# Patient Record
Sex: FEMALE | Race: AMERICAN INDIAN OR ALASKA NATIVE
[De-identification: names, ages, dates, MRNs, and addresses within clinical notes are randomized per-mention and may not be internally consistent; named-entity substitution may affect disease eponyms.]

---

## 2017-04-10 ENCOUNTER — HOSPITAL ENCOUNTER (EMERGENCY)
Dept: HOSPITAL 5 - ED | Age: 32
Discharge: HOME | End: 2017-04-10
Payer: MEDICAID

## 2017-04-10 VITALS — DIASTOLIC BLOOD PRESSURE: 56 MMHG | SYSTOLIC BLOOD PRESSURE: 99 MMHG

## 2017-04-10 DIAGNOSIS — R10.9: ICD-10-CM

## 2017-04-10 DIAGNOSIS — F17.200: ICD-10-CM

## 2017-04-10 DIAGNOSIS — N39.0: Primary | ICD-10-CM

## 2017-04-10 LAB
ALBUMIN SERPL-MCNC: 3.6 G/DL (ref 3.9–5)
ALBUMIN/GLOB SERPL: 0.7 %
ALP SERPL-CCNC: 82 UNITS/L (ref 35–129)
ALT SERPL-CCNC: 9 UNITS/L (ref 7–56)
ANION GAP SERPL CALC-SCNC: 22 MMOL/L
BACTERIA #/AREA URNS HPF: (no result) /HPF
BASOPHILS NFR BLD AUTO: 0.6 % (ref 0–1.8)
BILIRUB SERPL-MCNC: 0.3 MG/DL (ref 0.1–1.2)
BILIRUB UR QL STRIP: (no result)
BLOOD UR QL VISUAL: (no result)
BUN SERPL-MCNC: 12 MG/DL (ref 7–17)
BUN/CREAT SERPL: 24 %
CALCIUM SERPL-MCNC: 8.7 MG/DL (ref 8.4–10.2)
CHLORIDE SERPL-SCNC: 93.3 MMOL/L (ref 98–107)
CO2 SERPL-SCNC: 20 MMOL/L (ref 22–30)
EOSINOPHIL NFR BLD AUTO: 0.1 % (ref 0–4.3)
GLUCOSE SERPL-MCNC: 88 MG/DL (ref 65–100)
HCT VFR BLD CALC: 36.7 % (ref 30.3–42.9)
HGB BLD-MCNC: 11.5 GM/DL (ref 10.1–14.3)
KETONES UR STRIP-MCNC: 80 MG/DL
LEUKOCYTE ESTERASE UR QL STRIP: (no result)
LIPASE SERPL-CCNC: 38 UNITS/L (ref 13–60)
MCH RBC QN AUTO: 22 PG (ref 28–32)
MCHC RBC AUTO-ENTMCNC: 31 % (ref 30–34)
MCV RBC AUTO: 71 FL (ref 79–97)
MUCOUS THREADS #/AREA URNS HPF: (no result) /HPF
NITRITE UR QL STRIP: (no result)
PH UR STRIP: 5 [PH] (ref 5–7)
PLATELET # BLD: 300 K/MM3 (ref 140–440)
POTASSIUM SERPL-SCNC: 4 MMOL/L (ref 3.6–5)
PROT SERPL-MCNC: 8.8 G/DL (ref 6.3–8.2)
RBC # BLD AUTO: 5.14 M/MM3 (ref 3.65–5.03)
RBC #/AREA URNS HPF: 13 /HPF (ref 0–6)
SODIUM SERPL-SCNC: 131 MMOL/L (ref 137–145)
UROBILINOGEN UR-MCNC: 2 MG/DL (ref ?–2)
WBC # BLD AUTO: 10.2 K/MM3 (ref 4.5–11)
WBC #/AREA URNS HPF: 1 /HPF (ref 0–6)

## 2017-04-10 PROCEDURE — 81025 URINE PREGNANCY TEST: CPT

## 2017-04-10 PROCEDURE — 36415 COLL VENOUS BLD VENIPUNCTURE: CPT

## 2017-04-10 PROCEDURE — 85025 COMPLETE CBC W/AUTO DIFF WBC: CPT

## 2017-04-10 PROCEDURE — 96361 HYDRATE IV INFUSION ADD-ON: CPT

## 2017-04-10 PROCEDURE — 83690 ASSAY OF LIPASE: CPT

## 2017-04-10 PROCEDURE — 81001 URINALYSIS AUTO W/SCOPE: CPT

## 2017-04-10 PROCEDURE — 96375 TX/PRO/DX INJ NEW DRUG ADDON: CPT

## 2017-04-10 PROCEDURE — 99283 EMERGENCY DEPT VISIT LOW MDM: CPT

## 2017-04-10 PROCEDURE — 96374 THER/PROPH/DIAG INJ IV PUSH: CPT

## 2017-04-10 PROCEDURE — 80053 COMPREHEN METABOLIC PANEL: CPT

## 2017-04-10 NOTE — EMERGENCY DEPARTMENT REPORT
ED Abdominal Pain HPI





- General


Chief Complaint: Abdominal Pain


Stated Complaint: ABD PAIN


Time Seen by Provider: 04/10/17 18:02


Source: patient


Mode of arrival: Ambulatory


Limitations: No Limitations





- History of Present Illness


Initial Comments: 





Patient is a 31-year-old female with a history of neurofibromatosis.  Patient 

presents with a week history of URI symptoms consisting of cough, nausea, 

vomiting, and mild diffuse abdominal pain.  She reports she has been taking over

-the-counter cold medicine with minor relief.  Last dose was 2 nights ago.  He 

is reporting a reduced appetite and is having bowel movements last bowel 

movement was 3 days ago, which is normal for the patient.  Otherwise no fevers, 

chills, headache, shortness of breath, chest pain, trauma, sick contacts, or 

travel.


MD Complaint: abdominal pain


-: week(s) (1)


Location: diffuse





- Related Data


 Previous Rx's











 Medication  Instructions  Recorded  Last Taken  Type


 


Docusate Sodium [Colace CAP] 100 mg PO BID PRN #20 capsule 08/31/16 Unknown Rx


 


Ibuprofen [Motrin 600 MG tab] 600 mg PO Q8H PRN #30 tablet 09/22/16 Unknown Rx


 


Cefdinir 300 mg PO BID #20 capsule 04/10/17 Unknown Rx











 Allergies











Allergy/AdvReac Type Severity Reaction Status Date / Time


 


No Known Allergies Allergy   Verified 09/21/16 18:57














ED Review of Systems


ROS: 


Stated complaint: ABD PAIN


Other details as noted in HPI








ED Past Medical Hx





- Past Medical History


Additional medical history: NEUROFIBROMATOSIS





- Surgical History


Additional Surgical History: TUMORS REMOVED FROM BACK AND LEFT FOOT





- Social History


Smoking Status: Current Every Day Smoker


Substance Use Type: None





- Medications


Home Medications: 


 Home Medications











 Medication  Instructions  Recorded  Confirmed  Last Taken  Type


 


Docusate Sodium [Colace CAP] 100 mg PO BID PRN #20 capsule 08/31/16  Unknown Rx


 


Ibuprofen [Motrin 600 MG tab] 600 mg PO Q8H PRN #30 tablet 09/22/16  Unknown Rx


 


Cefdinir 300 mg PO BID #20 capsule 04/10/17  Unknown Rx














ED Physical Exam





- General


Limitations: No Limitations


General appearance: alert, in no apparent distress





- Head


Head exam: Present: atraumatic, normocephalic





- Eye


Eye exam: Present: normal appearance





- ENT


ENT exam: Present: mucous membranes moist





- Neck


Neck exam: Present: normal inspection





- Respiratory


Respiratory exam: Present: normal lung sounds bilaterally.  Absent: respiratory 

distress





- Cardiovascular


Cardiovascular Exam: Present: regular rate, normal rhythm.  Absent: systolic 

murmur, diastolic murmur, rubs, gallop





- GI/Abdominal


GI/Abdominal exam: Present: soft, normal bowel sounds.  Absent: distended, 

tenderness, guarding





- Extremities Exam


Extremities exam: Present: normal inspection





- Back Exam


Back exam: Present: normal inspection





- Neurological Exam


Neurological exam: Present: alert, oriented X3





- Skin


Skin exam: Present: warm, dry, other (neurofibromas)





ED Course


 Vital Signs











  04/10/17 04/10/17 04/10/17





  13:06 17:43 17:44


 


Temperature 98.2 F  


 


Pulse Rate 85 93 H 84


 


Respiratory 18 18 15





Rate   


 


Blood Pressure 114/65  


 


O2 Sat by Pulse 100  





Oximetry   














  04/10/17 04/10/17 04/10/17





  17:46 17:48 17:50


 


Temperature   


 


Pulse Rate 88 88 87


 


Respiratory 19 13 11 L





Rate   


 


Blood Pressure  119/54 119/54


 


O2 Sat by Pulse 100 100 100





Oximetry   














  04/10/17 04/10/17 04/10/17





  17:52 17:54 17:56


 


Temperature   


 


Pulse Rate 80 85 88


 


Respiratory 11 L 15 13





Rate   


 


Blood Pressure 119/54 119/54 119/54


 


O2 Sat by Pulse 100 100 100





Oximetry   














  04/10/17 04/10/17 04/10/17





  17:58 18:00 18:02


 


Temperature   


 


Pulse Rate 84 91 H 83


 


Respiratory 14 13 10 L





Rate   


 


Blood Pressure 119/54 131/96 131/96


 


O2 Sat by Pulse 100 100 100





Oximetry   














  04/10/17 04/10/17 04/10/17





  18:04 18:11 18:30


 


Temperature  98.1 F 


 


Pulse Rate 79  


 


Respiratory 10 L 10 L 10 L





Rate   


 


Blood Pressure 131/96  


 


O2 Sat by Pulse 100 100 





Oximetry   














  04/10/17





  19:00


 


Temperature 


 


Pulse Rate 


 


Respiratory 10 L





Rate 


 


Blood Pressure 


 


O2 Sat by Pulse 





Oximetry 














ED Medical Decision Making





- Lab Data


Result diagrams: 


 04/10/17 13:13





 04/10/17 13:13


Critical care attestation.: 


If time is entered above; I have spent that time in minutes in the direct care 

of this critically ill patient, excluding procedure time.








ED Disposition


Clinical Impression: 


 Abdominal pain, UTI (urinary tract infection)





Disposition: DISCHARGED TO HOME OR SELFCARE


Is pt being admited?: No


Does the pt Need Aspirin: No


Condition: Stable


Instructions:  Urinary Tract Infection in Women (ED), Abdominal Pain (ED)


Prescriptions: 


Cefdinir 300 mg PO BID #20 capsule


Referrals: 


PRIMARY CARE,MD [Primary Care Provider] - 3-5 Days

## 2017-08-16 ENCOUNTER — HOSPITAL ENCOUNTER (EMERGENCY)
Dept: HOSPITAL 5 - ED | Age: 32
Discharge: HOME | End: 2017-08-16
Payer: MEDICAID

## 2017-08-16 VITALS — SYSTOLIC BLOOD PRESSURE: 106 MMHG | DIASTOLIC BLOOD PRESSURE: 61 MMHG

## 2017-08-16 DIAGNOSIS — N12: Primary | ICD-10-CM

## 2017-08-16 DIAGNOSIS — F17.200: ICD-10-CM

## 2017-08-16 LAB
%HYPO/RBC NFR BLD AUTO: (no result) %
ALBUMIN SERPL-MCNC: 3.9 G/DL (ref 3.9–5)
ALBUMIN/GLOB SERPL: 0.9 %
ALP SERPL-CCNC: 72 UNITS/L (ref 35–129)
ALT SERPL-CCNC: 11 UNITS/L (ref 7–56)
ANION GAP SERPL CALC-SCNC: 22 MMOL/L
BILIRUB SERPL-MCNC: 0.6 MG/DL (ref 0.1–1.2)
BILIRUB UR QL STRIP: (no result)
BLASTOCYTES % (MANUAL): 0 %
BLOOD UR QL VISUAL: (no result)
BUN SERPL-MCNC: 9 MG/DL (ref 7–17)
BUN/CREAT SERPL: 15 %
CALCIUM SERPL-MCNC: 9 MG/DL (ref 8.4–10.2)
CHLORIDE SERPL-SCNC: 98.5 MMOL/L (ref 98–107)
CO2 SERPL-SCNC: 21 MMOL/L (ref 22–30)
GLUCOSE SERPL-MCNC: 87 MG/DL (ref 65–100)
HCT VFR BLD CALC: 31 % (ref 30.3–42.9)
HGB BLD-MCNC: 9.9 GM/DL (ref 10.1–14.3)
KETONES UR STRIP-MCNC: (no result) MG/DL
LEUKOCYTE ESTERASE UR QL STRIP: (no result)
LIPASE SERPL-CCNC: 21 UNITS/L (ref 13–60)
MCH RBC QN AUTO: 21 PG (ref 28–32)
MCHC RBC AUTO-ENTMCNC: 32 % (ref 30–34)
MCV RBC AUTO: 67 FL (ref 79–97)
MICROCYTES BLD QL SMEAR: (no result)
MUCOUS THREADS #/AREA URNS HPF: (no result) /HPF
NITRITE UR QL STRIP: (no result)
PH UR STRIP: 5 [PH] (ref 5–7)
PLATELET # BLD: 436 K/MM3 (ref 140–440)
POIKILOCYTOSIS BLD QL SMEAR: (no result)
POTASSIUM SERPL-SCNC: 3.9 MMOL/L (ref 3.6–5)
PROT SERPL-MCNC: 8.4 G/DL (ref 6.3–8.2)
RBC # BLD AUTO: 4.64 M/MM3 (ref 3.65–5.03)
RBC #/AREA URNS HPF: 8 /HPF (ref 0–6)
SODIUM SERPL-SCNC: 138 MMOL/L (ref 137–145)
TOTAL CELLS COUNTED PERCENT: 16
UROBILINOGEN UR-MCNC: < 2 MG/DL (ref ?–2)
WBC # BLD AUTO: 10.6 K/MM3 (ref 4.5–11)
WBC #/AREA URNS HPF: 53 /HPF (ref 0–6)

## 2017-08-16 PROCEDURE — 83690 ASSAY OF LIPASE: CPT

## 2017-08-16 PROCEDURE — 81001 URINALYSIS AUTO W/SCOPE: CPT

## 2017-08-16 PROCEDURE — 99284 EMERGENCY DEPT VISIT MOD MDM: CPT

## 2017-08-16 PROCEDURE — 85025 COMPLETE CBC W/AUTO DIFF WBC: CPT

## 2017-08-16 PROCEDURE — 87076 CULTURE ANAEROBE IDENT EACH: CPT

## 2017-08-16 PROCEDURE — 96365 THER/PROPH/DIAG IV INF INIT: CPT

## 2017-08-16 PROCEDURE — 85007 BL SMEAR W/DIFF WBC COUNT: CPT

## 2017-08-16 PROCEDURE — 87186 SC STD MICRODIL/AGAR DIL: CPT

## 2017-08-16 PROCEDURE — 87210 SMEAR WET MOUNT SALINE/INK: CPT

## 2017-08-16 PROCEDURE — 87086 URINE CULTURE/COLONY COUNT: CPT

## 2017-08-16 PROCEDURE — 80053 COMPREHEN METABOLIC PANEL: CPT

## 2017-08-16 PROCEDURE — 96375 TX/PRO/DX INJ NEW DRUG ADDON: CPT

## 2017-08-16 PROCEDURE — 36415 COLL VENOUS BLD VENIPUNCTURE: CPT

## 2017-08-16 PROCEDURE — 87591 N.GONORRHOEAE DNA AMP PROB: CPT

## 2017-08-16 NOTE — EMERGENCY DEPARTMENT REPORT
ED General Adult HPI





- General


Chief complaint: Abdominal Pain


Stated complaint: HEADACHE/STOMACH PAIN/


Time Seen by Provider: 08/16/17 18:37


Source: patient, RN notes reviewed, old records reviewed


Mode of arrival: Ambulatory


Limitations: No Limitations





- History of Present Illness


Initial comments: 


This is a 31-year-old female.  I have evaluated her in the past.  Past medical 

history includes neurofibromatosis, CT scan confirmed pyelonephritis, which was 

pansensitive.  The patient presents to the ER today with complaint of flank pain

, and epigastric abdominal pain.  She also describes feelings of hot and cold, 

has nausea with no emesis, and suprapubic pain.  To me she denies headache.  

The symptoms have been going on for the past 4 days.  She reports there is 

similar to prior episodes of pyelonephritis.





No recent antibiotic use.








-: Gradual


Location: back, abdomen


Consistency: intermittent


Improves with: rest


Worsens with: movement


Associated Symptoms: diaphoresis, fever/chills, loss of appetite, malaise, 

nausea/vomiting, weakness.  denies: confusion, chest pain





- Related Data


 Previous Rx's











 Medication  Instructions  Recorded  Last Taken  Type


 


Docusate Sodium [Colace CAP] 100 mg PO BID PRN #20 capsule 08/31/16 Unknown Rx


 


Ibuprofen [Motrin 600 MG tab] 600 mg PO Q8H PRN #30 tablet 09/22/16 Unknown Rx


 


Cefdinir 300 mg PO BID #20 capsule 04/10/17 Unknown Rx


 


Ibuprofen [Motrin] 600 mg PO Q8H PRN #30 tablet 08/16/17 Unknown Rx


 


Levofloxacin [Levaquin] 750 mg PO QDAY #10 tablet 08/16/17 Unknown Rx


 


Metoclopramide [Reglan] 10 mg PO QID PRN #30 tablet 08/16/17 Unknown Rx


 


oxyCODONE [Roxicodone] 5 mg PO Q6HR PRN #15 tablet 08/16/17 Unknown Rx











 Allergies











Allergy/AdvReac Type Severity Reaction Status Date / Time


 


No Known Allergies Allergy   Verified 09/21/16 18:57














ED Review of Systems


ROS: 


Stated complaint: HEADACHE/STOMACH PAIN/


Other details as noted in HPI





Constitutional: malaise


Eyes: denies: vision change


ENT: denies: epistaxis


Respiratory: denies: cough


Cardiovascular: denies: chest pain


Gastrointestinal: abdominal pain


Genitourinary: denies: abnormal menses


Musculoskeletal: back pain


Skin: denies: lesions


Neurological: denies: weakness


Psychiatric: denies: anxiety





ED Past Medical Hx





- Past Medical History


Previous Medical History?: Yes


Additional medical history: NEUROFIBROMATOSIS





- Surgical History


Past Surgical History?: Yes


Additional Surgical History: TUMORS REMOVED FROM BACK AND LEFT FOOT





- Social History


Smoking Status: Current Every Day Smoker


Substance Use Type: None





- Medications


Home Medications: 


 Home Medications











 Medication  Instructions  Recorded  Confirmed  Last Taken  Type


 


Docusate Sodium [Colace CAP] 100 mg PO BID PRN #20 capsule 08/31/16  Unknown Rx


 


Ibuprofen [Motrin 600 MG tab] 600 mg PO Q8H PRN #30 tablet 09/22/16  Unknown Rx


 


Cefdinir 300 mg PO BID #20 capsule 04/10/17  Unknown Rx


 


Ibuprofen [Motrin] 600 mg PO Q8H PRN #30 tablet 08/16/17  Unknown Rx


 


Levofloxacin [Levaquin] 750 mg PO QDAY #10 tablet 08/16/17  Unknown Rx


 


Metoclopramide [Reglan] 10 mg PO QID PRN #30 tablet 08/16/17  Unknown Rx


 


oxyCODONE [Roxicodone] 5 mg PO Q6HR PRN #15 tablet 08/16/17  Unknown Rx














ED Physical Exam





- General


Limitations: No Limitations


General appearance: alert, in no apparent distress





- Head


Head exam: Present: atraumatic, normocephalic





- Eye


Eye exam: Present: normal appearance, PERRL, EOMI, other (visual acuity intact 

to finger counting, color perception, reading at a close distance).  Absent: 

nystagmus





- ENT


ENT exam: Present: normal exam, normal orophraynx, mucous membranes moist, 

normal external ear exam





- Neck


Neck exam: Present: normal inspection, full ROM.  Absent: tenderness, 

meningismus





- Respiratory


Respiratory exam: Present: normal lung sounds bilaterally.  Absent: respiratory 

distress, wheezes, rales, rhonchi, stridor, chest wall tenderness, accessory 

muscle use, decreased breath sounds, prolonged expiratory





- Cardiovascular


Cardiovascular Exam: Present: regular rate, normal rhythm, normal heart sounds.

  Absent: bradycardia, tachycardia, irregular rhythm, systolic murmur, 

diastolic murmur, rubs, gallop





- GI/Abdominal


GI/Abdominal exam: Present: soft, tenderness, normal bowel sounds, other (there 

is epigastric tenderness.  There is suprapubic tenderness.  There is no right 

lower quadrant tenderness.).  Absent: distended, guarding, rebound, rigid, 

pulsatile mass





- 


External exam: Present: normal external exam


Speculum exam: Present: normal speculum exam


Bi-manual exam: Present: normal bi-manual exam, other (escorted by a paramedic 

Kamille Garcia).  Absent: cervical motion tendernes, adnexal tenderness, adnexal 

mass, uterine enlargement, uterine tenderness





- Extremities Exam


Extremities exam: Present: normal inspection, full ROM, normal capillary 

refill.  Absent: pedal edema, joint swelling, calf tenderness





- Back Exam


Back exam: Present: normal inspection, full ROM, CVA tenderness (R), CVA 

tenderness (L).  Absent: tenderness, paraspinal tenderness, vertebral tenderness





- Neurological Exam


Neurological exam: Present: alert, oriented X3, normal gait, other (Extraocular 

movements intact.  Tongue midline.  No facial droop.  Facial sensation intact 

to light touch in the V1, V2, V3 distribution bilaterally.  5 and 5 strength in 

4 extremities..  Sensation is intact to light touch in 4 extremities.).  Absent

: motor sensory deficit





- Psychiatric


Psychiatric exam: Present: normal affect, normal mood





- Skin


Skin exam: Present: warm, dry, intact, normal color.  Absent: rash





ED Course


 Vital Signs











  08/16/17 08/16/17





  09:12 19:04


 


Temperature 100 F H 99.8 F H


 


Pulse Rate 88 82


 


Respiratory 15 18





Rate  


 


Blood Pressure 97/60 


 


Blood Pressure  106/61





[Left]  


 


O2 Sat by Pulse 100 100





Oximetry  














- Reevaluation(s)


Reevaluation #1: 





08/17/17 02:16 prior to discharge, the patient appeared quite comfortable, was 

eating and drinking without difficulty, and endorse that her symptoms had 

markedly improved and resolved.

















ED Medical Decision Making





- Lab Data


Result diagrams: 


 08/16/17 09:21





 08/16/17 09:21








 Vital Signs











  08/16/17 08/16/17





  09:12 19:04


 


Temperature 100 F H 99.8 F H


 


Pulse Rate 88 82


 


Respiratory 15 18





Rate  


 


Blood Pressure 97/60 


 


Blood Pressure  106/61





[Left]  


 


O2 Sat by Pulse 100 100





Oximetry  














 Lab Results











  08/16/17 08/16/17 08/16/17 Range/Units





  09:21 09:21 10:41 


 


WBC  10.6    (4.5-11.0)  K/mm3


 


RBC  4.64    (3.65-5.03)  M/mm3


 


Hgb  9.9 L    (10.1-14.3)  gm/dl


 


Hct  31.0    (30.3-42.9)  %


 


MCV  67 L    (79-97)  fl


 


MCH  21 L    (28-32)  pg


 


MCHC  32    (30-34)  %


 


RDW  17.6 H    (13.2-15.2)  %


 


Plt Count  436    (140-440)  K/mm3


 


Mono % (Auto)  Np    


 


Add Manual Diff  Complete    


 


Total Counted  100    


 


Seg Neuts % (Manual)  70.0    (40.0-70.0)  %


 


Band Neutrophils %  3.0    %


 


Lymphocytes % (Manual)  11.0 L    (13.4-35.0)  %


 


Reactive Lymphs % (Man)  0    %


 


Monocytes % (Manual)  16.0 H    (0.0-7.3)  %


 


Eosinophils % (Manual)  0    (0.0-4.3)  %


 


Basophils % (Manual)  0    (0.0-1.8)  %


 


Metamyelocytes %  0    %


 


Myelocytes %  0    %


 


Promyelocytes %  0    %


 


Blast Cells %  0    %


 


Nucleated RBC %  Not Reportable    


 


Seg Neutrophils # Man  7.4    (1.8-7.7)  K/mm3


 


Band Neutrophils #  0.3    K/mm3


 


Lymphocytes # (Manual)  1.2    (1.2-5.4)  K/mm3


 


Abs React Lymphs (Man)  0.0    K/mm3


 


Monocytes # (Manual)  1.7 H    (0.0-0.8)  K/mm3


 


Eosinophils # (Manual)  0.0    (0.0-0.4)  K/mm3


 


Basophils # (Manual)  0.0    (0.0-0.1)  K/mm3


 


Metamyelocytes #  0.0    K/mm3


 


Myelocytes #  0.0    K/mm3


 


Promyelocytes #  0.0    K/mm3


 


Blast Cells #  0.0    K/mm3


 


WBC Morphology  Not Reportable    


 


Hypersegmented Neuts  Not Reportable    


 


Hyposegmented Neuts  Not Reportable    


 


Hypogranular Neuts  Not Reportable    


 


Smudge Cells  Not Reportable    


 


Toxic Granulation  Not Reportable    


 


Toxic Vacuolation  Not Reportable    


 


Dohle Bodies  Not Reportable    


 


Pelger-Huet Anomaly  Not Reportable    


 


Tanya Rods  Not Reportable    


 


Platelet Estimate  Not Reportable    


 


Clumped Platelets  Not Reportable    


 


Plt Clumps, EDTA  Not Reportable    


 


Large Platelets  Not Reportable    


 


Giant Platelets  Not Reportable    


 


Platelet Satelliting  Not Reportable    


 


Plt Morphology Comment  Not Reportable    


 


RBC Morphology  Not Reportable    


 


Dimorphic RBCs  Not Reportable    


 


Polychromasia  Not Reportable    


 


Hypochromasia  2+    


 


Poikilocytosis  1+    


 


Anisocytosis  Not Reportable    


 


Microcytosis  2+    


 


Macrocytosis  Not Reportable    


 


Spherocytes  Not Reportable    


 


Pappenheimer Bodies  Not Reportable    


 


Sickle Cells  Not Reportable    


 


Target Cells  Not Reportable    


 


Tear Drop Cells  Not Reportable    


 


Ovalocytes  Not Reportable    


 


Helmet Cells  Not Reportable    


 


[place] Bodies  Not Reportable    


 


[place] Rings  Not Reportable    


 


Kassandra Cells  Not Reportable    


 


Bite Cells  Not Reportable    


 


Crenated Cell  Not Reportable    


 


Elliptocytes  Not Reportable    


 


Acanthocytes (Spur)  Not Reportable    


 


Rouleaux  Not Reportable    


 


Hemoglobin C Crystals  Not Reportable    


 


Schistocytes  Not Reportable    


 


Malaria parasites  Not Reportable    


 


Ulysses Bodies  Not Reportable    


 


Hem Pathologist Commnt  No    


 


Sodium   138   (137-145)  mmol/L


 


Potassium   3.9   (3.6-5.0)  mmol/L


 


Chloride   98.5   ()  mmol/L


 


Carbon Dioxide   21 L   (22-30)  mmol/L


 


Anion Gap   22   mmol/L


 


BUN   9   (7-17)  mg/dL


 


Creatinine   0.6 L   (0.7-1.2)  mg/dL


 


Estimated GFR   > 60   ml/min


 


BUN/Creatinine Ratio   15.00   %


 


Glucose   87   ()  mg/dL


 


Calcium   9.0   (8.4-10.2)  mg/dL


 


Total Bilirubin   0.60   (0.1-1.2)  mg/dL


 


AST   13   (5-40)  units/L


 


ALT   11   (7-56)  units/L


 


Alkaline Phosphatase   72   ()  units/L


 


Total Protein   8.4 H   (6.3-8.2)  g/dL


 


Albumin   3.9   (3.9-5)  g/dL


 


Albumin/Globulin Ratio   0.9   %


 


Lipase   21   (13-60)  units/L


 


Urine Color    Yellow  (Yellow)  


 


Urine Turbidity    Slightly-cloudy  (Clear)  


 


Urine pH    5.0  (5.0-7.0)  


 


Ur Specific Gravity    1.016  (1.003-1.030)  


 


Urine Protein    30 mg/dl  (Negative)  mg/dL


 


Urine Glucose (UA)    Neg  (Negative)  mg/dL


 


Urine Ketones    Tr  (Negative)  mg/dL


 


Urine Blood    Mod  (Negative)  


 


Urine Nitrite    Pos  (Negative)  


 


Urine Bilirubin    Neg  (Negative)  


 


Urine Urobilinogen    < 2.0  (<2.0)  mg/dL


 


Ur Leukocyte Esterase    Lg  (Negative)  


 


Urine WBC (Auto)    53.0 H  (0.0-6.0)  /HPF


 


Urine RBC (Auto)    8.0  (0.0-6.0)  /HPF


 


U Epithel Cells (Auto)    3.0  (0-13.0)  /HPF


 


Ur Transition Epith Cell    1  /HPF


 


Urine Mucus    3+  /HPF

















- Medical Decision Making


Differential diagnosis: Cystitis, pyelonephritis





Assessment and plan: 31-year-old female with an identical presentation to when 

I evaluated her September 2016.  She endorses urinary frequency and obstructive 

urinary symptoms.  Urinalysis today demonstrates positive nitrites, large leuks

, 53 WBCs, with suprapubic tenderness and bilateral CVA tenderness.  There is 

no gynecologic tenderness, there is no right lower quadrant tenderness.  

Extensive discussion had with patient.  I don't believe she requires CT 

scanning at this time, as I think her clinical presentation is consistent with 

pyelonephritis.








This was discussed with the patient, who agreed, and through shared decision 

making, declined a CT scan (concerned with radiation induced malignancy)


which I think is reasonable.  The patient will be started empirically on 

appropriate therapy for pyelonephritis, and she is going to follow up in 2-3 

days with either myself or her primary care doctor for repeat evaluation.  She 

is afebrile at this time with reassuring vital signs, and is reliable for 

discharge with his plan of care.  A urine culture from September 2016 

demonstrated pan sensitivity.  She denies headache to me, she has an 

appropriate neurologic examination, with a GCS of 15, NIH score of 0, with no 

neck pain or neck stiffness or nuchal rigidity.














Critical care attestation.: 


If time is entered above; I have spent that time in minutes in the direct care 

of this critically ill patient, excluding procedure time.








ED Disposition


Clinical Impression: 


 Pyelonephritis





Disposition: DC-01 TO HOME OR SELFCARE


Is pt being admited?: No


Does the pt Need Aspirin: No


Condition: Stable


Instructions:  Acute Pyelonephritis (ED)


Additional Instructions: 


Cultures were sent today, results will be available in the next 3-5 days.  

Please have a primary care doctor contact the medical records department to 

obtain culture results.  Avoid alcohol consumption when taking the pain 

medication, nausea medication, antibiotics.  Follow up in the next 48-72 hours 

for repeat evaluation.  Follow up with either a primary care doctor, return to 

the ER for repeat evaluation by myself or another ER provider.


Return to the ER right away with new pain, worsened pain, migration of pain, 

fevers, chills, intractable nausea or vomiting, inability to tolerate liquid 

feeds, confusion, change in mental status.











Prescriptions: 


Ibuprofen [Motrin] 600 mg PO Q8H PRN #30 tablet


 PRN Reason: Pain


Levofloxacin [Levaquin] 750 mg PO QDAY #10 tablet


Metoclopramide [Reglan] 10 mg PO QID PRN #30 tablet


 PRN Reason: Nausea


oxyCODONE [Roxicodone] 5 mg PO Q6HR PRN #15 tablet


 PRN Reason: Pain


Referrals: 


PRIMARY CARE,MD [Primary Care Provider] - 3-5 Days


STEFANIA RM MD [Staff Physician] - 3-5 Days

## 2017-08-18 ENCOUNTER — HOSPITAL ENCOUNTER (EMERGENCY)
Dept: HOSPITAL 5 - ED | Age: 32
Discharge: HOME | End: 2017-08-18
Payer: MEDICAID

## 2017-08-18 VITALS — DIASTOLIC BLOOD PRESSURE: 70 MMHG | SYSTOLIC BLOOD PRESSURE: 113 MMHG

## 2017-08-18 DIAGNOSIS — F17.200: ICD-10-CM

## 2017-08-18 DIAGNOSIS — Z09: Primary | ICD-10-CM

## 2017-08-18 PROCEDURE — 99282 EMERGENCY DEPT VISIT SF MDM: CPT

## 2017-08-18 NOTE — EMERGENCY DEPARTMENT REPORT
ED Recheck HPI





- General


Chief Complaint: Recheck/Abnormal Lab/Rx


Stated Complaint: CALLED BACK/ABD PAIN


Time Seen by Provider: 08/18/17 17:50


Source: patient, RN notes reviewed, old records reviewed


Mode of arrival: Ambulatory


Limitations: No Limitations





- History of Present Illness


Initial Comments: 





This is a 31-year-old female.  I recently evaluated her and presumptively treat 

her for pyelonephritis.  I asked her to come back in a few days for recheck.  

She reports that all of her symptoms are improving.  She is able to tolerate 

liquid feeds.  She currently has no headache, neck pain, chest pain, abdominal 

pain or shortness of breath, and she endorses compliance with her prescribed 

therapy.  Urine culture is pending, however thus far it is demonstrating gram-

negative rods.  Sensitivities pending.


MD Complaint: other


Returns Today for: other


Symptoms Since Prior Visit: no new symptoms, improved


Context: planned re-check


Associated Symptoms: none


Treatments Prior to Arrival: Given Antibiotics on, Given Pain Meds on, other





- Related Data


 Previous Rx's











 Medication  Instructions  Recorded  Last Taken  Type


 


Docusate Sodium [Colace CAP] 100 mg PO BID PRN #20 capsule 08/31/16 Unknown Rx


 


Ibuprofen [Motrin 600 MG tab] 600 mg PO Q8H PRN #30 tablet 09/22/16 Unknown Rx


 


Cefdinir 300 mg PO BID #20 capsule 04/10/17 Unknown Rx


 


Ibuprofen [Motrin] 600 mg PO Q8H PRN #30 tablet 08/16/17 Unknown Rx


 


Levofloxacin [Levaquin] 750 mg PO QDAY #10 tablet 08/16/17 Unknown Rx


 


Metoclopramide [Reglan] 10 mg PO QID PRN #30 tablet 08/16/17 Unknown Rx


 


oxyCODONE [Roxicodone] 5 mg PO Q6HR PRN #15 tablet 08/16/17 Unknown Rx











 Allergies











Allergy/AdvReac Type Severity Reaction Status Date / Time


 


No Known Allergies Allergy   Verified 08/18/17 17:28














ED Review of Systems


ROS: 


Stated complaint: CALLED BACK/ABD PAIN


Other details as noted in HPI





Constitutional: denies: fever, malaise


Eyes: denies: vision change


ENT: denies: epistaxis


Respiratory: denies: cough


Cardiovascular: denies: palpitations


Gastrointestinal: denies: abdominal pain


Genitourinary: denies: urgency, dysuria


Musculoskeletal: denies: back pain


Skin: denies: rash, lesions


Neurological: denies: headache


Psychiatric: denies: anxiety





ED Past Medical Hx





- Past Medical History


Additional medical history: NEUROFIBROMATOSIS





- Surgical History


Additional Surgical History: TUMORS REMOVED FROM BACK AND LEFT FOOT





- Social History


Smoking Status: Current Every Day Smoker


Substance Use Type: Prescribed





- Medications


Home Medications: 


 Home Medications











 Medication  Instructions  Recorded  Confirmed  Last Taken  Type


 


Docusate Sodium [Colace CAP] 100 mg PO BID PRN #20 capsule 08/31/16  Unknown Rx


 


Ibuprofen [Motrin 600 MG tab] 600 mg PO Q8H PRN #30 tablet 09/22/16  Unknown Rx


 


Cefdinir 300 mg PO BID #20 capsule 04/10/17  Unknown Rx


 


Ibuprofen [Motrin] 600 mg PO Q8H PRN #30 tablet 08/16/17  Unknown Rx


 


Levofloxacin [Levaquin] 750 mg PO QDAY #10 tablet 08/16/17  Unknown Rx


 


Metoclopramide [Reglan] 10 mg PO QID PRN #30 tablet 08/16/17  Unknown Rx


 


oxyCODONE [Roxicodone] 5 mg PO Q6HR PRN #15 tablet 08/16/17  Unknown Rx














ED Physical Exam





- General


Limitations: No Limitations


General appearance: alert, in no apparent distress





- Head


Head exam: Present: atraumatic, normocephalic





- Eye


Eye exam: Present: normal appearance, EOMI.  Absent: nystagmus





- ENT


ENT exam: Present: normal exam, normal orophraynx, mucous membranes moist, 

normal external ear exam





- Neck


Neck exam: Present: normal inspection, full ROM





- Respiratory


Respiratory exam: Present: normal lung sounds bilaterally.  Absent: respiratory 

distress





- Cardiovascular


Cardiovascular Exam: Present: regular rate, normal rhythm, normal heart sounds.

  Absent: bradycardia, tachycardia, irregular rhythm, systolic murmur, 

diastolic murmur, rubs, gallop





- GI/Abdominal


GI/Abdominal exam: Present: soft, normal bowel sounds.  Absent: distended, 

tenderness, guarding, rebound, rigid, pulsatile mass





- Extremities Exam


Extremities exam: Present: normal inspection, normal capillary refill.  Absent: 

calf tenderness





- Back Exam


Back exam: Present: normal inspection, full ROM.  Absent: tenderness, CVA 

tenderness (R), CVA tenderness (L)





- Neurological Exam


Neurological exam: Present: alert, oriented X3, normal gait, other (Extraocular 

movements intact.  Tongue midline.  No facial droop.  Facial sensation intact 

to light touch in the V1, V2, V3 distribution bilaterally.  5 and 5 strength in 

4 extremities..  Sensation is intact to light touch in 4 extremities.).  Absent

: motor sensory deficit





- Psychiatric


Psychiatric exam: Present: normal affect, normal mood





- Skin


Skin exam: Present: warm, dry, intact, normal color.  Absent: rash





ED Course


 Vital Signs











  08/18/17





  17:28


 


Temperature 98.5 F


 


Pulse Rate 84


 


Respiratory 15





Rate 


 


Blood Pressure 113/70


 


O2 Sat by Pulse 100





Oximetry 














ED Recheck MDM





- Core Measures


Measure Exclusions: not indicated





- Medical Decision Making





Differential diagnosis: Pyelonephritis, follow-up, clinically improving





Assessment and plan: 31-year-old female who is dramatically improving with the 

presumed diagnosis of pyelonephritis.  Cultures are pending, sensitivities not 

resulted, she appears to be improving, she is suitable to follow up with 

outpatient primary care.  Return precautions are reviewed.


Critical care attestation.: 


If time is entered above; I have spent that time in minutes in the direct care 

of this critically ill patient, excluding procedure time.








ED Disposition


Clinical Impression: 


 Follow up





Disposition: DC-01 TO HOME OR SELFCARE


Is pt being admited?: No


Does the pt Need Aspirin: No


Condition: Good


Instructions:  Acute Pyelonephritis (ED)


Additional Instructions: 


Continue current outpatient antibiotics.  Continue outpatient pain medication 

and nausea medication.  Follow up with a primary care doctor within 2 weeks.  

Return to the ER runaway with fevers, chills, chest pain, shortness of breath, 

intractable nausea or vomiting, confusion, inability to tolerate liquid feeds.


Referrals: 


CATHLEEN SAAVEDRA MD [Staff Physician] - 3-5 Days

## 2017-10-01 ENCOUNTER — HOSPITAL ENCOUNTER (EMERGENCY)
Dept: HOSPITAL 5 - ED | Age: 32
Discharge: HOME | End: 2017-10-01
Payer: MEDICAID

## 2017-10-01 VITALS — DIASTOLIC BLOOD PRESSURE: 60 MMHG | SYSTOLIC BLOOD PRESSURE: 107 MMHG

## 2017-10-01 DIAGNOSIS — F17.200: ICD-10-CM

## 2017-10-01 DIAGNOSIS — L02.01: Primary | ICD-10-CM

## 2017-10-01 LAB
ANION GAP SERPL CALC-SCNC: 17 MMOL/L
BUN SERPL-MCNC: 10 MG/DL (ref 7–17)
BUN/CREAT SERPL: 16.66 %
CALCIUM SERPL-MCNC: 9.3 MG/DL (ref 8.4–10.2)
CHLORIDE SERPL-SCNC: 102.5 MMOL/L (ref 98–107)
CO2 SERPL-SCNC: 27 MMOL/L (ref 22–30)
GLUCOSE SERPL-MCNC: 72 MG/DL (ref 65–100)
HCT VFR BLD CALC: 31.5 % (ref 30.3–42.9)
HGB BLD-MCNC: 9.8 GM/DL (ref 10.1–14.3)
MCH RBC QN AUTO: 21 PG (ref 28–32)
MCHC RBC AUTO-ENTMCNC: 31 % (ref 30–34)
MCV RBC AUTO: 68 FL (ref 79–97)
PLATELET # BLD: 364 K/MM3 (ref 140–440)
POTASSIUM SERPL-SCNC: 4 MMOL/L (ref 3.6–5)
RBC # BLD AUTO: 4.63 M/MM3 (ref 3.65–5.03)
SODIUM SERPL-SCNC: 142 MMOL/L (ref 137–145)
WBC # BLD AUTO: 8.8 K/MM3 (ref 4.5–11)

## 2017-10-01 PROCEDURE — 85025 COMPLETE CBC W/AUTO DIFF WBC: CPT

## 2017-10-01 PROCEDURE — 99283 EMERGENCY DEPT VISIT LOW MDM: CPT

## 2017-10-01 PROCEDURE — 36415 COLL VENOUS BLD VENIPUNCTURE: CPT

## 2017-10-01 PROCEDURE — 80048 BASIC METABOLIC PNL TOTAL CA: CPT

## 2017-10-01 NOTE — EMERGENCY DEPARTMENT REPORT
HPI





- General


Chief Complaint: Skin/Abscess/Foreign Body


Time Seen by Provider: 10/01/17 17:05





- HPI


HPI: 





This is a 32 year-old  female presents to the emergency 

department, dropped off by a friend, with complaint of an area of swelling to 

the left upper face lateral to the eye has been going on for the past 4 days.  

She says that it is tender but there has been no bleeding, weeping or drainage.

  She has not taken anything for her symptoms prior to presentation.  She 

denies any fever, vision change but says that the swelling in this area as 

causing a slight headache.  She has a past medical history of 

neurofibromatosis.  She does not currently have a primary care physician.  No 

recent travel or sick contacts at home.





ED Past Medical Hx





- Past Medical History


Previous Medical History?: Yes


Additional medical history: NEUROFIBROMATOSIS





- Surgical History


Past Surgical History?: Yes


Additional Surgical History: TUMORS REMOVED FROM BACK AND LEFT FOOT





- Social History


Smoking Status: Current Every Day Smoker


Substance Use Type: Alcohol





- Medications


Home Medications: 


 Home Medications











 Medication  Instructions  Recorded  Confirmed  Last Taken  Type


 


Docusate Sodium [Colace CAP] 100 mg PO BID PRN #20 capsule 08/31/16  Unknown Rx


 


Ibuprofen [Motrin 600 MG tab] 600 mg PO Q8H PRN #30 tablet 09/22/16  Unknown Rx


 


Cefdinir 300 mg PO BID #20 capsule 04/10/17  Unknown Rx


 


Ibuprofen [Motrin] 600 mg PO Q8H PRN #30 tablet 08/16/17  Unknown Rx


 


Levofloxacin [Levaquin] 750 mg PO QDAY #10 tablet 08/16/17  Unknown Rx


 


Metoclopramide [Reglan] 10 mg PO QID PRN #30 tablet 08/16/17  Unknown Rx


 


oxyCODONE [Roxicodone] 5 mg PO Q6HR PRN #15 tablet 08/16/17  Unknown Rx


 


HYDROcodone/APAP 5-325 [[place] 1 each PO Q6HR PRN #8 tablet 10/01/17  Unknown Rx





5/325]     


 


Sulfamethoxazole/Trimethoprim 1 each PO BID #14 tablet 10/01/17  Unknown Rx





[Bactrim DS TAB]     














ED Review of Systems


ROS: 


Stated complaint: FACIAL SWELLING


Other details as noted in HPI





Comment: All other systems reviewed and negative


Constitutional: denies: chills, fever


Eyes: denies: eye pain, eye discharge, vision change


ENT: denies: ear pain, throat pain


Respiratory: denies: cough, shortness of breath, wheezing


Cardiovascular: denies: chest pain, palpitations


Gastrointestinal: denies: abdominal pain, nausea, diarrhea


Genitourinary: denies: urgency, dysuria, discharge


Musculoskeletal: denies: back pain, joint swelling, arthralgia


Skin: lesions.  denies: rash


Neurological: headache.  denies: weakness





Physical Exam





- Physical Exam


Vital Signs: 


 Vital Signs











  10/01/17





  15:33


 


Temperature 98.9 F


 


Pulse Rate 68


 


Respiratory 18





Rate 


 


Blood Pressure 101/56


 


O2 Sat by Pulse 100





Oximetry 











Physical Exam: 


GENERAL: The patient is well-developed well-nourished.


HENT: Normocephalic.  Atraumatic.    Patient has moist mucous membranes.


EYES: Extraocular motions are intact.  Pupils equal reactive to light 

bilaterally.


NECK: Supple.  Trachea is midline.


CHEST/LUNGS: Clear to auscultation.  There is no respiratory distress noted.


HEART/CARDIOVASCULAR: Regular.  There is no tachycardia.  There is no gallop 

rub or murmur.


ABDOMEN: Abdomen is soft, nontender.  Patient has normal bowel sounds.  There 

is no abdominal distention.


SKIN: There is a lesion to the left upper lateral face that is circular, raised

, is about 3 cm in diameter.  The border is more indurated while the central 

area is slightly fluctuant.  There is no erythema or significant warmth.


NEURO: The patient is awake, alert, and oriented.  The patient is cooperative.  

The patient has no focal neurologic deficits.  The patient has normal speech.


MUSCULOSKELETAL: There is no tenderness or deformity.  There is no limitation 

range of motion.  There is no evidence of acute injury.








ED Course


 Vital Signs











  10/01/17





  15:33


 


Temperature 98.9 F


 


Pulse Rate 68


 


Respiratory 18





Rate 


 


Blood Pressure 101/56


 


O2 Sat by Pulse 100





Oximetry 














- I & D


  ** Left Face


Type of Procedure: Simple


Site: left lateral face


Blade Size: 11


I & D Procedure: betadine prep, sterile drapes applied, sterile dressing applied


Progress: 


About 2 mL of 2% lidocaine without epinephrine were injected into the suspected 

abscess.  A 1 cm incision was made with an 11 blade scalpel.  There was a very 

small amount of purulent discharge and some mild bleeding.  Pressure was held 

and sterile gauze was applied.  No obvious complications.  The patient 

tolerated the procedure well.








ED Medical Decision Making





- Lab Data


Result diagrams: 


 10/01/17 15:48





 10/01/17 15:48





- Medical Decision Making


Patient presents with 4 day history of some swelling to the left upper lateral 

face.  With history of neurofibromatosis, it is possible that it is a fibroma.  

However he developed within 4 days, has a central area of fluctuance and 

appears consistent with an abscess.  I spoke to the patient in detail about the 

procedure to do a incision and drainage and she understands that doing so on 

the face could leave a small scar, but the patient understands and asked for 

the procedure to be done.  Incision and drainage was done with only a very 

small amount of purulent return as it mostly appears indurated.  However there 

is now on opening into the wound/lesion and the patient will use warm 

compresses.  She will also be placed on antibiotics.  Vital signs stable 

including being afebrile.  She has been encouraged to follow up with a primary 

care physician and return to the ER with any worsening of her symptoms or any 

acute distress.  We discussed worsening signs or symptoms of infection and 

reasons to return to the emergency department.








- Differential Diagnosis


abscess, neurofibroma, cyst


Critical Care Time: No


Critical care attestation.: 


If time is entered above; I have spent that time in minutes in the direct care 

of this critically ill patient, excluding procedure time.








ED Disposition


Clinical Impression: 


 Swelling of left side of face, Abscess





Disposition: DC-01 TO HOME OR SELFCARE


Is pt being admited?: No


Condition: Stable


Instructions:  Abscess Incision and Drainage (ED), Abscess (ED)


Additional Instructions: 


Please follow up with a primary care doctor in the next 2 days for a wound 

check.  Use a warm, but not hot, compress or washcloth to place against the 

swollen area and/or abscess and try to express infection.  Return to the 

emergency department immediately with any worsening of the swelling, 

surrounding redness, development of fever or any acute distress.  Take the 

antibiotics as prescribed. You have been prescribed a medication that is 

sedating and therefore should not be taken prior to driving, working, and 

responsible for children and in no way should be mixed with alcohol of any 

quantity.


Prescriptions: 


HYDROcodone/APAP 5-325 [[place] 5/325] 1 each PO Q6HR PRN #8 tablet


 PRN Reason: Pain


Sulfamethoxazole/Trimethoprim [Bactrim DS TAB] 1 each PO BID #14 tablet


Referrals: 


PRIMARY CARE,MD [Primary Care Provider] - 3-5 Days


VINAY WALKER JR, MD [Staff Physician] - 3-5 Days


NEREIDA RIOS MD [Staff Physician] - 3-5 Days


[facility] [Outside] - 3-5 Days


[facility] [Outside] - 3-5 Days


[facility] [Outside] - 3-5 Days


Time of Disposition: 18:15

## 2018-01-14 ENCOUNTER — HOSPITAL ENCOUNTER (EMERGENCY)
Dept: HOSPITAL 5 - ED | Age: 33
Discharge: HOME | End: 2018-01-14
Payer: MEDICAID

## 2018-01-14 VITALS — DIASTOLIC BLOOD PRESSURE: 59 MMHG | SYSTOLIC BLOOD PRESSURE: 112 MMHG

## 2018-01-14 DIAGNOSIS — L08.9: ICD-10-CM

## 2018-01-14 DIAGNOSIS — R59.1: Primary | ICD-10-CM

## 2018-01-14 DIAGNOSIS — F17.200: ICD-10-CM

## 2018-01-14 PROCEDURE — 96372 THER/PROPH/DIAG INJ SC/IM: CPT

## 2018-01-14 PROCEDURE — 99282 EMERGENCY DEPT VISIT SF MDM: CPT

## 2018-01-14 NOTE — EMERGENCY DEPARTMENT REPORT
Abscess Boil HPI





- HPI


Chief Complaint: Skin/Abscess/Foreign Body


Stated Complaint: FACIAL SWELLING


Time Seen by Provider: 01/14/18 20:29


Duration: 2 Days


Location: Other (left facial pain and swelling 2 days)


Severity: Severe (pain is 10/10 and sore/aching.  Worse to touch and eating)


History: Yes Pain (lt facial area and chin and swelling), No Fever, No Purulent 

Drainage, No Numbness, No Foreign Body, No Previous History, No Insect Bite


HPI: Patient here complaining of left sided facial pain and pain at her chin 

area since 01/12/2018.  She had similar incident in the past.  Tetanus vaccine 

is up-to-date.  She denies any drooling or trouble swallowing.  Denies any sore 

throat.  Denies any toothache.  Patient says she had a pimple on her left face 

and it got bigger now she is having swelling around the area.  Pain is 10 out 

of 10 sore and aching.  She says she took over-the-counter pain medication but 

it's not working.  Patient does have a primary care physician and she does have 

access to a dermatologist but she says she did not go to her doctor.  She said 

last time it happened a told her that she has an infection and treated her with 

antibiotic.


Home Medications: 


 Previous Rx's











 Medication  Instructions  Recorded  Last Taken  Type


 


Docusate Sodium [Colace CAP] 100 mg PO BID PRN #20 capsule 08/31/16 Unknown Rx


 


Ibuprofen [Motrin 600 MG tab] 600 mg PO Q8H PRN #30 tablet 09/22/16 Unknown Rx


 


Cefdinir 300 mg PO BID #20 capsule 04/10/17 Unknown Rx


 


Levofloxacin [Levaquin] 750 mg PO QDAY #10 tablet 08/16/17 Unknown Rx


 


Metoclopramide [Reglan] 10 mg PO QID PRN #30 tablet 08/16/17 Unknown Rx


 


oxyCODONE [Roxicodone] 5 mg PO Q6HR PRN #15 tablet 08/16/17 Unknown Rx


 


HYDROcodone/APAP 5-325 [[place] 1 each PO Q6HR PRN #8 tablet 10/01/17 Unknown Rx





5/325]    


 


Sulfamethoxazole/Trimethoprim 1 each PO BID #14 tablet 10/01/17 Unknown Rx





[Bactrim DS TAB]    


 


Acetaminophen/Codeine [Tylenol 1 tab PO Q6H PRN 3 Days #12 tab 01/14/18 Unknown 

Rx





/Codeine # 3 tab]    


 


Clindamycin [Clindamycin CAP] 300 mg PO Q8H 10 Days #30 cap 01/14/18 Unknown Rx


 


Ibuprofen [Motrin 600 MG tab] 600 mg PO Q8H PRN 5 Days #15 tablet 01/14/18 

Unknown Rx











Allergies/Adverse Reactions: 


 Allergies











Allergy/AdvReac Type Severity Reaction Status Date / Time


 


No Known Allergies Allergy   Verified 08/18/17 17:28














ED Review of Systems


ROS: 


Stated complaint: FACIAL SWELLING


Other details as noted in HPI





Comment: All other systems reviewed and negative


Constitutional: no symptoms reported


Eyes: denies: eye pain, eye discharge


ENT: other (Lt facial pain and swelling.  Swelling to chin).  denies: ear pain, 

throat pain, dental pain, epistaxis, congestion


Respiratory: no symptoms reported


Cardiovascular: denies: chest pain, palpitations, dyspnea on exertion, edema, 

syncope


Gastrointestinal: denies: abdominal pain, nausea, vomiting, diarrhea, 

constipation


Musculoskeletal: denies: back pain, joint swelling, arthralgia, myalgia


Skin: denies: rash, lesions


Neurological: denies: headache, weakness, numbness, paresthesias, confusion, 

abnormal gait, vertigo





ED Past Medical Hx





- Past Medical History


Previous Medical History?: Yes


Additional medical history: NEUROFIBROMATOSIS





- Surgical History


Past Surgical History?: Yes


Additional Surgical History: TUMORS REMOVED FROM BACK AND LEFT FOOT





- Family History


Family history: hypertension





- Social History


Smoking Status: Current Every Day Smoker


Substance Use Type: None





- Medications


Home Medications: 


 Home Medications











 Medication  Instructions  Recorded  Confirmed  Last Taken  Type


 


Docusate Sodium [Colace CAP] 100 mg PO BID PRN #20 capsule 08/31/16  Unknown Rx


 


Ibuprofen [Motrin 600 MG tab] 600 mg PO Q8H PRN #30 tablet 09/22/16  Unknown Rx


 


Cefdinir 300 mg PO BID #20 capsule 04/10/17  Unknown Rx


 


Levofloxacin [Levaquin] 750 mg PO QDAY #10 tablet 08/16/17  Unknown Rx


 


Metoclopramide [Reglan] 10 mg PO QID PRN #30 tablet 08/16/17  Unknown Rx


 


oxyCODONE [Roxicodone] 5 mg PO Q6HR PRN #15 tablet 08/16/17  Unknown Rx


 


HYDROcodone/APAP 5-325 [[place] 1 each PO Q6HR PRN #8 tablet 10/01/17  Unknown Rx





5/325]     


 


Sulfamethoxazole/Trimethoprim 1 each PO BID #14 tablet 10/01/17  Unknown Rx





[Bactrim DS TAB]     


 


Acetaminophen/Codeine [Tylenol 1 tab PO Q6H PRN 3 Days #12 tab 01/14/18  

Unknown Rx





/Codeine # 3 tab]     


 


Clindamycin [Clindamycin CAP] 300 mg PO Q8H 10 Days #30 cap 01/14/18  Unknown Rx


 


Ibuprofen [Motrin 600 MG tab] 600 mg PO Q8H PRN 5 Days #15 tablet 01/14/18  

Unknown Rx














ED Abscess Boil Physical Exam





- Exam


General: 


Vital signs noted. No distress. Alert and acting appropriately.


This is a 32-year-old female well-nourished well-developed in no acute distress.


Front/Back of Body, Lg (Color): 


  __________________________














  __________________________





 1 - Patient with left facial pustule with indurated area without any 

fluctuance and swelling around indurated area.  Tender to palpate.  Pustule 

area erythema with no drainage surrounding soft tissue swelling with tenderness 

to palpate.  No oral mucosal abscess noted.  Uvula is midline and no 

peritonsillar abscess





Size: 1 cm


Exam: Yes Tenderness (left facial pustule with soft tissue swelling), Yes 

Surrounding Cellulites/Erythema (erythema cellulitis at pustule.), Yes 

Lymphangitis (submental), Yes Normal Neurologic Exam, Yes Normal Circulation (

no clubbing, cyanosis or edema.  +2 pulses to all extremities.  No 

neurovascular compromise.), No Fluctuance, No Crepitation, No Heart Murmur (S1, 

S2.  Regular rate and rhythm.)


Exam: Neck: Supple, full range of motion.  No C-spine tenderness. no 

adenopathy.  No stridor.  Lymph: positive submental adenipathy.  Mouth: Patient 

with multiple filling, moist, tongue is normal, no dental tenderness, uvula is 

midline and oral airways patent.  No peritonsillar abscess noted.  Lungs: Clear 

to auscultation bilaterally, no rhonchi wheezes or rales.  Normal work of 

breathing





I & D Note





- I & D Note


I & D Note: Patient with pustule to left facial area with surrounding soft 

tissue swelling.  Possibly area indurated fluctuant.  Tender to palpate.  

Unable to drain due to non-fluctuance.  Immunization up-to-date





ED Course


 Vital Signs











  01/14/18





  12:09


 


Temperature 98.2 F


 


Pulse Rate 85


 


Respiratory 18





Rate 


 


Blood Pressure 106/66


 


O2 Sat by Pulse 100





Oximetry 














- Reevaluation(s)


Reevaluation #1: 





01/14/18 22:58


Patient given Percocet 5/325 2 tablets and clindamycin 600 mg IM in emergency 

room without any adverse reaction.


Critical care attestation.: 


If time is entered above; I have spent that time in minutes in the direct care 

of this critically ill patient, excluding procedure time.








ED Disposition


Clinical Impression: 


 Skin pustule, Localized soft tissue swelling, Lymphadenopathy, submental





Disposition: DC-01 TO HOME OR SELFCARE


Is pt being admited?: No


Does the pt Need Aspirin: No


Condition: Stable


Instructions:  Lymphadenopathy (ED), Cellulitis (ED)


Additional Instructions: 


Please follow up with her primary care physician as instructed.


Follow-up with dermatologist as instructed


Return to emergency room if affected area to left face increased in size, 

increase in swelling, erythema, and difficulty opening and closing mouth.


Take Tylenol 3 and Motrin as instructed for pain.


Take clindamycin as instructed for infection.


Keep affected area clean and dry


Prescriptions: 


Acetaminophen/Codeine [Tylenol /Codeine # 3 tab] 1 tab PO Q6H PRN 3 Days #12 tab


 PRN Reason: Pain, Moderate (4-6)


Clindamycin [Clindamycin CAP] 300 mg PO Q8H 10 Days #30 cap


Ibuprofen [Motrin 600 MG tab] 600 mg PO Q8H PRN 5 Days #15 tablet


 PRN Reason: Pain


Referrals: 


your, primary care physician [Other] - 2-3 Days


ESTUARDO FLANNERY MD [Staff Physician] - 01/16/18


Forms:  Work/School Release Form(ED)

## 2018-05-22 ENCOUNTER — HOSPITAL ENCOUNTER (EMERGENCY)
Dept: HOSPITAL 5 - ED | Age: 33
LOS: 1 days | Discharge: HOME | End: 2018-05-23
Payer: MEDICAID

## 2018-05-22 VITALS — DIASTOLIC BLOOD PRESSURE: 67 MMHG | SYSTOLIC BLOOD PRESSURE: 111 MMHG

## 2018-05-22 DIAGNOSIS — F17.200: ICD-10-CM

## 2018-05-22 DIAGNOSIS — N30.01: Primary | ICD-10-CM

## 2018-05-22 PROCEDURE — 99283 EMERGENCY DEPT VISIT LOW MDM: CPT

## 2018-05-22 PROCEDURE — 81025 URINE PREGNANCY TEST: CPT

## 2018-05-22 PROCEDURE — 81001 URINALYSIS AUTO W/SCOPE: CPT

## 2018-05-23 LAB
BILIRUB UR QL STRIP: (no result)
BLOOD UR QL VISUAL: (no result)
MUCOUS THREADS #/AREA URNS HPF: (no result) /HPF
PH UR STRIP: 5 [PH] (ref 5–7)
PROT UR STRIP-MCNC: (no result) MG/DL
RBC #/AREA URNS HPF: 2 /HPF (ref 0–6)
UROBILINOGEN UR-MCNC: < 2 MG/DL (ref ?–2)
WBC #/AREA URNS HPF: 15 /HPF (ref 0–6)

## 2018-05-23 NOTE — EMERGENCY DEPARTMENT REPORT
ED Female  HPI





- General


Chief complaint: Back Pain/Injury


Stated complaint: LOSS OF APPETITE/ABD PAIN


Time Seen by Provider: 05/23/18 01:52


Source: patient


Mode of arrival: Ambulatory


Limitations: No Limitations





- History of Present Illness


Initial comments: 





32-year-old Afro-American female comes in complaining of lower back pain 

headache and increased urination.  Patient reports the onset this a.m.  Patient 

reports a past medical history of neurofibromatosis.  She denies any dysuria 

and no fever with chills denies any nausea vomiting or diarrhea denies 

abdominal pain reports left flank pain.  Does admit to urinary urgency but no 

dysuria.


-: This morning


Radiation: L flank


Severity: severe


Severity scale (0 -10): 8


Quality: sharp, stabbing


Consistency: constant


Worsens with: movement


Are you Pregnant Now?: No


Last Menstrual Period: 04/18/18


EDC: 01/23/19





- Related Data


 Previous Rx's











 Medication  Instructions  Recorded  Last Taken  Type


 


Docusate Sodium [Colace CAP] 100 mg PO BID PRN #20 capsule 08/31/16 Unknown Rx


 


Cefdinir 300 mg PO BID #20 capsule 04/10/17 Unknown Rx


 


Metoclopramide [Reglan] 10 mg PO QID PRN #30 tablet 08/16/17 Unknown Rx


 


HYDROcodone/APAP 5-325 [Anderson 1 each PO Q6HR PRN #8 tablet 10/01/17 Unknown Rx





5/325]    


 


Acetaminophen/Codeine [Tylenol 1 tab PO Q6H PRN 3 Days #12 tab 01/14/18 Unknown 

Rx





/Codeine # 3 tab]    


 


Clindamycin [Clindamycin CAP] 300 mg PO Q8H 10 Days #30 cap 01/14/18 Unknown Rx


 


Ibuprofen [Motrin 600 MG tab] 600 mg PO Q8H PRN #30 tablet 05/23/18 Unknown Rx


 


Nitrofurantoin Monohyd/M-Cryst 100 mg PO BID #20 capsule 05/23/18 Unknown Rx





[Macrobid 100 mg Capsule]    











 Allergies











Allergy/AdvReac Type Severity Reaction Status Date / Time


 


No Known Allergies Allergy   Verified 08/18/17 17:28














ED Review of Systems


ROS: 


Stated complaint: LOSS OF APPETITE/ABD PAIN


Other details as noted in HPI





Constitutional: chills


Eyes: denies: eye pain, eye discharge, vision change


ENT: denies: ear pain, throat pain


Respiratory: denies: cough, shortness of breath, wheezing


Cardiovascular: denies: chest pain, palpitations


Endocrine: no symptoms reported


Gastrointestinal: denies: abdominal pain, nausea, diarrhea


Genitourinary: urgency, frequency.  denies: dysuria, hematuria


Musculoskeletal: back pain


Skin: denies: rash, lesions


Neurological: headache, weakness


Psychiatric: denies: anxiety, depression


Hematological/Lymphatic: as per HPI





ED Past Medical Hx





- Past Medical History


Previous Medical History?: Yes


Additional medical history: NEUROFIBROMATOSIS





- Surgical History


Past Surgical History?: Yes


Additional Surgical History: TUMORS REMOVED FROM BACK AND LEFT FOOT, 2005





- Social History


Smoking Status: Current Every Day Smoker


Substance Use Type: None





- Medications


Home Medications: 


 Home Medications











 Medication  Instructions  Recorded  Confirmed  Last Taken  Type


 


Docusate Sodium [Colace CAP] 100 mg PO BID PRN #20 capsule 08/31/16  Unknown Rx


 


Cefdinir 300 mg PO BID #20 capsule 04/10/17  Unknown Rx


 


Metoclopramide [Reglan] 10 mg PO QID PRN #30 tablet 08/16/17  Unknown Rx


 


HYDROcodone/APAP 5-325 [Anderson 1 each PO Q6HR PRN #8 tablet 10/01/17  Unknown Rx





5/325]     


 


Acetaminophen/Codeine [Tylenol 1 tab PO Q6H PRN 3 Days #12 tab 01/14/18  

Unknown Rx





/Codeine # 3 tab]     


 


Clindamycin [Clindamycin CAP] 300 mg PO Q8H 10 Days #30 cap 01/14/18  Unknown Rx


 


Ibuprofen [Motrin 600 MG tab] 600 mg PO Q8H PRN #30 tablet 05/23/18  Unknown Rx


 


Nitrofurantoin Monohyd/M-Cryst 100 mg PO BID #20 capsule 05/23/18  Unknown Rx





[Macrobid 100 mg Capsule]     














ED Physical Exam





- General


Limitations: No Limitations


General appearance: alert, in no apparent distress





- Head


Head exam: Present: atraumatic, normocephalic





- Eye


Eye exam: Present: normal appearance





- Respiratory


Respiratory exam: Present: normal lung sounds bilaterally.  Absent: respiratory 

distress





- Cardiovascular


Cardiovascular Exam: Present: regular rate, normal rhythm.  Absent: systolic 

murmur, diastolic murmur, rubs, gallop





- GI/Abdominal


GI/Abdominal exam: Present: soft, normal bowel sounds





- Back Exam


Back exam: Present: CVA tenderness (L)





- Neurological Exam


Neurological exam: Present: alert, oriented X3





- Psychiatric


Psychiatric exam: Present: normal affect, normal mood





- Skin


Skin exam: Present: warm, dry, intact, normal color.  Absent: rash





ED Course





 Vital Signs











  05/22/18





  20:44


 


Temperature 99.8 F H


 


Pulse Rate 101 H


 


Respiratory 20





Rate 


 


Blood Pressure 111/67


 


O2 Sat by Pulse 100





Oximetry 














ED Medical Decision Making





- Medical Decision Making





Patient's been evaluated for this provider fast track.  I discussed the patient 

that appears that she has a urinary tract infection.  Discussed the patient I'

ll place her on antibiotics pain medication and have her follow up with her 

primary care provider.  Patient verbalized understanding.


Critical care attestation.: 


If time is entered above; I have spent that time in minutes in the direct care 

of this critically ill patient, excluding procedure time.








ED Disposition


Clinical Impression: 


UTI (urinary tract infection)


Qualifiers:


 Urinary tract infection type: acute cystitis Hematuria presence: with 

hematuria Qualified Code(s): N30.01 - Acute cystitis with hematuria





Disposition: DC-01 TO HOME OR SELFCARE


Is pt being admited?: No


Does the pt Need Aspirin: No


Condition: Stable


Instructions:  Urinary Tract Infection in Women (ED)


Additional Instructions: 


Please complete antibiotics as prescribed.  Please take pain medication as 

prescribed with food.  Follow-up with her primary care provider in the next 3-5 

days.


Prescriptions: 


Ibuprofen [Motrin 600 MG tab] 600 mg PO Q8H PRN #30 tablet


 PRN Reason: Pain


Nitrofurantoin Monohyd/M-Cryst [Macrobid 100 mg Capsule] 100 mg PO BID #20 

capsule


Referrals: 


PRIMARY CARE,MD [Primary Care Provider] - 3-5 Days


[facility] [Provider Group] - 3-5 Days


Forms:  Work/School Release Form(ED)

## 2018-05-24 ENCOUNTER — HOSPITAL ENCOUNTER (EMERGENCY)
Dept: HOSPITAL 5 - ED | Age: 33
Discharge: HOME | End: 2018-05-24
Payer: MEDICAID

## 2018-05-24 VITALS — SYSTOLIC BLOOD PRESSURE: 102 MMHG | DIASTOLIC BLOOD PRESSURE: 60 MMHG

## 2018-05-24 DIAGNOSIS — K59.00: ICD-10-CM

## 2018-05-24 DIAGNOSIS — E87.1: ICD-10-CM

## 2018-05-24 DIAGNOSIS — D72.829: ICD-10-CM

## 2018-05-24 DIAGNOSIS — F17.200: ICD-10-CM

## 2018-05-24 DIAGNOSIS — N28.1: ICD-10-CM

## 2018-05-24 DIAGNOSIS — N12: Primary | ICD-10-CM

## 2018-05-24 LAB
BACTERIA #/AREA URNS HPF: (no result) /HPF
BASOPHILS # (AUTO): 0 K/MM3 (ref 0–0.1)
BASOPHILS NFR BLD AUTO: 0.1 % (ref 0–1.8)
BILIRUB UR QL STRIP: (no result)
BLOOD UR QL VISUAL: (no result)
BUN SERPL-MCNC: 12 MG/DL (ref 7–17)
BUN/CREAT SERPL: 20 %
CALCIUM SERPL-MCNC: 9.2 MG/DL (ref 8.4–10.2)
EOSINOPHIL # BLD AUTO: 0 K/MM3 (ref 0–0.4)
EOSINOPHIL NFR BLD AUTO: 0 % (ref 0–4.3)
HCT VFR BLD CALC: 33.5 % (ref 30.3–42.9)
HEMOLYSIS INDEX: 15
HGB BLD-MCNC: 10.7 GM/DL (ref 10.1–14.3)
LYMPHOCYTES # BLD AUTO: 0.5 K/MM3 (ref 1.2–5.4)
LYMPHOCYTES NFR BLD AUTO: 2.7 % (ref 13.4–35)
MCH RBC QN AUTO: 22 PG (ref 28–32)
MCHC RBC AUTO-ENTMCNC: 32 % (ref 30–34)
MCV RBC AUTO: 68 FL (ref 79–97)
MONOCYTES # (AUTO): 2 K/MM3 (ref 0–0.8)
MONOCYTES % (AUTO): 11 % (ref 0–7.3)
MUCOUS THREADS #/AREA URNS HPF: (no result) /HPF
PH UR STRIP: 5 [PH] (ref 5–7)
PLATELET # BLD: 218 K/MM3 (ref 140–440)
RBC # BLD AUTO: 4.91 M/MM3 (ref 3.65–5.03)
RBC #/AREA URNS HPF: 9 /HPF (ref 0–6)
UROBILINOGEN UR-MCNC: < 2 MG/DL (ref ?–2)
WBC #/AREA URNS HPF: 45 /HPF (ref 0–6)

## 2018-05-24 PROCEDURE — 96361 HYDRATE IV INFUSION ADD-ON: CPT

## 2018-05-24 PROCEDURE — 87086 URINE CULTURE/COLONY COUNT: CPT

## 2018-05-24 PROCEDURE — 81001 URINALYSIS AUTO W/SCOPE: CPT

## 2018-05-24 PROCEDURE — 96374 THER/PROPH/DIAG INJ IV PUSH: CPT

## 2018-05-24 PROCEDURE — 80048 BASIC METABOLIC PNL TOTAL CA: CPT

## 2018-05-24 PROCEDURE — 81025 URINE PREGNANCY TEST: CPT

## 2018-05-24 PROCEDURE — 85025 COMPLETE CBC W/AUTO DIFF WBC: CPT

## 2018-05-24 PROCEDURE — 36415 COLL VENOUS BLD VENIPUNCTURE: CPT

## 2018-05-24 PROCEDURE — 96375 TX/PRO/DX INJ NEW DRUG ADDON: CPT

## 2018-05-24 PROCEDURE — 99284 EMERGENCY DEPT VISIT MOD MDM: CPT

## 2018-05-24 PROCEDURE — 74176 CT ABD & PELVIS W/O CONTRAST: CPT

## 2018-05-24 NOTE — EMERGENCY DEPARTMENT REPORT
Chief Complaint: Urogenital-Female


Stated Complaint: FLU LIKE SYMPTOMS


Time Seen by Provider: 05/24/18 09:40





- HPI


History of Present Illness: 





32-year-old female presents with complaint of some burning with urination and 

generalized aches and pains.  She was diagnosed 2 days ago with a urinary tract 

infection and placed on Macrobid and Motrin.  She says that the medications are 

not working.  She has a history of neurofibromatosis.  She has a PCP but has 

not seen them regarding her symptoms.  No vaginal bleeding or discharge.  She 

complains of a headache but no vision change or neurological deficits.





- ROS


Review of Systems: 


Positive for dysuria, body aches, headache


Negative for fever, nausea, vomiting, vaginal bleeding or discharge








- Exam


Vital Signs: 


 Vital Signs











  05/24/18





  09:13


 


Temperature 98.1 F


 


Pulse Rate 87


 


Blood Pressure 113/57


 


O2 Sat by Pulse 100





Oximetry 











Physical Exam: 





Patient is sitting there comfortably and does not appear in any acute distress.

  Normal sounding heart and lungs to auscultation.


MSE screening note: 


Focused history and physical exam performed.


Due to findings the following was ordered:





I have ordered a CBC, BMP, urinalysis and urine pregnancy test and the patient 

will be seen on the Q-track side.





ED Disposition for MSE


Condition: Stable


Referrals: 


PRIMARY CARE,MD [Primary Care Provider] - 3-5 Days

## 2018-05-24 NOTE — EMERGENCY DEPARTMENT REPORT
ED Female  HPI





- General


Chief complaint: Urogenital-Female


Stated complaint: FLU LIKE SYMPTOMS


Time Seen by Provider: 18 09:40


Source: patient, family


Mode of arrival: Ambulatory


Limitations: No Limitations





- History of Present Illness


Initial comments: 








32-year-old female presents with complaint of some burning with urination and 

generalized aches and pains.  She was diagnosed 2 days ago with a urinary tract 

infection and placed on Macrobid and Motrin.  She says that the medications are 

not working.  She has a history of neurofibromatosis.  She has a PCP but has 

not seen them regarding her symptoms.  No vaginal bleeding or discharge.  She 

complains of a headache but no vision change or neurological deficits.


MD Complaint: dysuria, other (nausea vomiting, chills, flank pain I)


Onset/Timin


-: days(s)


Location: suprapubic, other (left flank)


Radiation: non-radiating


Severity scale (0 -10): 3


Quality: cramping (cramping the pelvic area), burning (burning with urination), 

aching (he came to left flank)


Consistency: constant, intermittent


Improves with: urination


Worsens with: urination, movement


Are you Pregnant Now?: No


Last Menstrual Period: 18


EDC: 19


Associated Symptoms: abdominal pain, nausea/vomiting, fever/chills, loss of 

appetite, dysuria.  denies: vaginal discharge, vaginal bleeding, headaches, 

hematuria, rash, seizure, shortness of breath, syncope, weakness





- Related Data


Sexually active: No


 Previous Rx's











 Medication  Instructions  Recorded  Last Taken  Type


 


Docusate Sodium [Colace CAP] 100 mg PO BID PRN #20 capsule 16 Unknown Rx


 


Cefdinir 300 mg PO BID #20 capsule 04/10/17 Unknown Rx


 


Metoclopramide [Reglan] 10 mg PO QID PRN #30 tablet 17 Unknown Rx


 


HYDROcodone/APAP 5-325 [Eccles 1 each PO Q6HR PRN #8 tablet 10/01/17 Unknown Rx





5/325]    


 


Acetaminophen/Codeine [Tylenol 1 tab PO Q6H PRN 3 Days #12 tab 18 Unknown 

Rx





/Codeine # 3 tab]    


 


Clindamycin [Clindamycin CAP] 300 mg PO Q8H 10 Days #30 cap 18 Unknown Rx


 


Ibuprofen [Motrin 600 MG tab] 600 mg PO Q8H PRN #30 tablet 18 Unknown Rx


 


Nitrofurantoin Monohyd/M-Cryst 100 mg PO BID #20 capsule 18 Unknown Rx





[Macrobid 100 mg Capsule]    


 


Levofloxacin [Levaquin] 750 mg PO QDAY 6 Days #6 tablet 18 Unknown Rx


 


Naproxen 500 mg PO Q12H PRN #12 tablet 18 Unknown Rx


 


Ondansetron [Zofran Odt] 4 mg PO Q6H PRN #12 tab.rapdis 18 Unknown Rx











 Allergies











Allergy/AdvReac Type Severity Reaction Status Date / Time


 


No Known Allergies Allergy   Verified 17 17:28














ED Review of Systems


ROS: 


Stated complaint: FLU LIKE SYMPTOMS


Other details as noted in HPI





Constitutional: denies: chills, fever


Eyes: denies: eye pain, eye discharge, vision change


ENT: denies: ear pain, throat pain


Respiratory: denies: cough, shortness of breath, SOB with exertion, SOB at rest

, stridor, wheezing


Cardiovascular: denies: chest pain, palpitations, edema, syncope


Endocrine: no symptoms reported


Gastrointestinal: abdominal pain, nausea, vomiting.  denies: diarrhea, 

constipation, hematemesis, melena, hematochezia


Genitourinary: dysuria.  denies: urgency, frequency, hematuria, discharge


Musculoskeletal: back pain (left flank pain).  denies: joint swelling, 

arthralgia


Skin: denies: rash, lesions


Neurological: denies: headache, weakness, numbness, paresthesias, confusion, 

abnormal gait, vertigo





ED Past Medical Hx





- Past Medical History


Previous Medical History?: Yes


Additional medical history: NEUROFIBROMATOSIS





- Surgical History


Past Surgical History?: Yes


Additional Surgical History: TUMORS REMOVED FROM BACK AND LEFT FOOT, 





- Family History


Family history: hypertension





- Social History


Smoking Status: Current Every Day Smoker


Substance Use Type: None


Other Social History: 





Single





- Medications


Home Medications: 


 Home Medications











 Medication  Instructions  Recorded  Confirmed  Last Taken  Type


 


Docusate Sodium [Colace CAP] 100 mg PO BID PRN #20 capsule 16  Unknown Rx


 


Cefdinir 300 mg PO BID #20 capsule 04/10/17  Unknown Rx


 


Metoclopramide [Reglan] 10 mg PO QID PRN #30 tablet 17  Unknown Rx


 


HYDROcodone/APAP 5-325 [Eccles 1 each PO Q6HR PRN #8 tablet 10/01/17  Unknown Rx





5/325]     


 


Acetaminophen/Codeine [Tylenol 1 tab PO Q6H PRN 3 Days #12 tab 18  

Unknown Rx





/Codeine # 3 tab]     


 


Clindamycin [Clindamycin CAP] 300 mg PO Q8H 10 Days #30 cap 18  Unknown Rx


 


Ibuprofen [Motrin 600 MG tab] 600 mg PO Q8H PRN #30 tablet 18  Unknown Rx


 


Nitrofurantoin Monohyd/M-Cryst 100 mg PO BID #20 capsule 18  Unknown Rx





[Macrobid 100 mg Capsule]     


 


Levofloxacin [Levaquin] 750 mg PO QDAY 6 Days #6 tablet 18  Unknown Rx


 


Naproxen 500 mg PO Q12H PRN #12 tablet 18  Unknown Rx


 


Ondansetron [Zofran Odt] 4 mg PO Q6H PRN #12 tab.rapdis 18  Unknown Rx














ED Physical Exam





- General


Limitations: No Limitations


General appearance: alert, in no apparent distress





- Head


Head exam: Present: atraumatic, normocephalic, normal inspection





- Eye


Eye exam: Present: normal appearance, PERRL, EOMI.  Absent: conjunctival 

injection


Pupils: Present: normal accommodation





- ENT


ENT exam: Present: normal exam, normal orophraynx, mucous membranes moist





- Neck


Neck exam: Present: normal inspection, full ROM.  Absent: tenderness, 

lymphadenopathy





- Respiratory


Respiratory exam: Present: normal lung sounds bilaterally.  Absent: respiratory 

distress, chest wall tenderness





- Cardiovascular


Cardiovascular Exam: Present: regular rate, normal rhythm, normal heart sounds





- GI/Abdominal


GI/Abdominal exam: Present: soft, normal bowel sounds.  Absent: distended, 

tenderness, guarding, rebound, rigid, organomegaly, mass, bruit, pulsatile mass

, hernia





- Extremities Exam


Extremities exam: Present: normal inspection, full ROM, normal capillary 

refill.  Absent: tenderness, pedal edema, joint swelling, calf tenderness





- Back Exam


Back exam: Present: normal inspection, full ROM, CVA tenderness (L), other (

ambulates without any difficulties).  Absent: tenderness, CVA tenderness (R), 

muscle spasm, paraspinal tenderness, vertebral tenderness, rash noted





- Neurological Exam


Neurological exam: Present: alert, oriented X3, normal gait





- Psychiatric


Psychiatric exam: Present: normal affect, normal mood





- Skin


Skin exam: Present: warm, dry, intact, normal color.  Absent: rash





ED Course


 Vital Signs











  18





  09:13 13:07 13:37


 


Temperature 98.1 F  


 


Pulse Rate 87  


 


Respiratory  18 18





Rate   


 


Blood Pressure 113/57  


 


Blood Pressure   





[Left]   


 


O2 Sat by Pulse 100  





Oximetry   














  18





  15:14 16:06


 


Temperature  100.3 F H


 


Pulse Rate  100 H


 


Respiratory 18 20





Rate  


 


Blood Pressure  


 


Blood Pressure  102/60





[Left]  


 


O2 Sat by Pulse  100





Oximetry  














- Reevaluation(s)


Reevaluation #1: 





18 12:43


Patient left CVA tenderness, white blood cell elevated with bacterial shift to 

the left, urinalysis with increase in white blood cell, patient with nausea and 

vomiting and, fever and chills and body ache.  Patient to receive IV fluid 

normal saline x1 litre, morphine 4 mg IV, Zofran 4 mg IV, Reglan 10 mg IV.  

Levaquin 750 mg by mouth and awaits CT scan of the abdomen and pelvis with iv 

contrast to check for stones.











Reevaluation #2: 





18 14:10


Pt stable no abdominal pain at present. Abd NTTP. Denies nausea


Reevaluation #3: 





18 15:05


Patient's stable and she is requesting in pain medication for pain at 4 out of 

10.  She'll be given Toradol 30 mg IV and I discussed results of CT scan with 

her and she voiced understanding.  Abdomen is nontender to palpate.  Nausea has 

resolved








Reevaluation #4: 





18 16:24


Patient stable, pain-free, feeling better and tolerating fluids.  Denies any 

nausea.





ED Medical Decision Making





- Lab Data


Result diagrams: 


 18 10:24





 18 10:24








 Lab Results











  18 Range/Units





  10:15 10:24 10:24 


 


WBC   18.3 H   (4.5-11.0)  K/mm3


 


RBC   4.91   (3.65-5.03)  M/mm3


 


Hgb   10.7   (10.1-14.3)  gm/dl


 


Hct   33.5   (30.3-42.9)  %


 


MCV   68 L   (79-97)  fl


 


MCH   22 L   (28-32)  pg


 


MCHC   32   (30-34)  %


 


RDW   19.0 H   (13.2-15.2)  %


 


Plt Count   218   (140-440)  K/mm3


 


Lymph % (Auto)   2.7 L   (13.4-35.0)  %


 


Mono % (Auto)   11.0 H   (0.0-7.3)  %


 


Eos % (Auto)   0.0   (0.0-4.3)  %


 


Baso % (Auto)   0.1   (0.0-1.8)  %


 


Lymph #   0.5 L   (1.2-5.4)  K/mm3


 


Mono #   2.0 H   (0.0-0.8)  K/mm3


 


Eos #   0.0   (0.0-0.4)  K/mm3


 


Baso #   0.0   (0.0-0.1)  K/mm3


 


Seg Neutrophils %   86.2 H   (40.0-70.0)  %


 


Seg Neutrophils #   15.8 H   (1.8-7.7)  K/mm3


 


Sodium    132 L  (137-145)  mmol/L


 


Potassium    4.1  (3.6-5.0)  mmol/L


 


Chloride    100.1  ()  mmol/L


 


Carbon Dioxide    20 L  (22-30)  mmol/L


 


Anion Gap    16  mmol/L


 


BUN    12  (7-17)  mg/dL


 


Creatinine    0.6 L  (0.7-1.2)  mg/dL


 


Estimated GFR    > 60  ml/min


 


BUN/Creatinine Ratio    20  %


 


Glucose    92  ()  mg/dL


 


Calcium    9.2  (8.4-10.2)  mg/dL


 


Urine Color  Yellow    (Yellow)  


 


Urine Turbidity  Clear    (Clear)  


 


Urine pH  5.0    (5.0-7.0)  


 


Ur Specific Gravity  1.017    (1.003-1.030)  


 


Urine Protein  100 mg/dl    (Negative)  mg/dL


 


Urine Glucose (UA)  Neg    (Negative)  mg/dL


 


Urine Ketones  20    (Negative)  mg/dL


 


Urine Blood  Mod    (Negative)  


 


Urine Nitrite  Neg    (Negative)  


 


Ur Reducing Substances  Not Reportable    


 


Urine Bilirubin  Neg    (Negative)  


 


Urine Ictotest  Not Reportable    


 


Urine Urobilinogen  < 2.0    (<2.0)  mg/dL


 


Ur Leukocyte Esterase  Lg    (Negative)  


 


Urine WBC (Auto)  45.0 H    (0.0-6.0)  /HPF


 


Urine RBC (Auto)  9.0    (0.0-6.0)  /HPF


 


U Epithel Cells (Auto)  5.0    (0-13.0)  /HPF


 


Urine Bacteria (Auto)  1+    (Negative)  /HPF


 


Urine Mucus  Few    /HPF


 


Urine Yeast (Budding)  1+    /HPF


 


Urine HCG, Qual  Negative    (Negative)  








Urine culture sent





- Radiology Data


Radiology results: report reviewed





CT scan of the abdomen and pelvis with IV contrast was dictated by [facility] 

radiologist and report given via telephone since radiology system is 

dysfunctional today.  It was reported that patient has normal appendix, no 

obstructive bowel disease, no inflammatory bowel disease, no obstructive renal 

or ureteral calculus.  No hydronephrosis.  1.5 cm cyst in left kidney which 

remains the same when compared to previous scan.  There is large amount of 

stool within the colon





- Medical Decision Making





ED Course





DX: 


1:Abdominal pain- resolved. Morphine 2mg iv, toradol 30 mg IV. will send home 

on naproxen.


CT scan of abdomen and Pelvis without contrast normal except left renal  cyst , 

large amount of stool in Colon other wise normal study. See  radiology report 

for details. report reviewed


CBC with increas wbc and bacterial CMP with ,  Neg pregnancy. UA positive 

ketones , bld wbc Leuk est, prorein. infection. See lab section for details


2: Left renal  Cyst- referred to Nephrology


3:Nausea and vomiting - resolved. Zofran 4 mg iv, reglan 10 mg iv in ed . Will 

send home on Zofran


4: Pyelonephritis- seen 2 days ago and placed on macrobid for cystitis . Not 

better. Levaquin 750 mg po in ED . UA worst than 2 days ago. infection, CX 

sent. Stop Macrobid. Will send home on Levaquin for additional 6 days


5: dehydration- NS x 1 liter. PO challenge and tolerated juices  without NV


6: Constipation- found on CT scan, will d/c with stool softner


7-Leukocytosis- from infection. Pt on ABX


8: Fever- lowgrade. Motrin 800mg in ed and will send home on antipyretic. 

Educated on increasing fluid intake


9: Hyponatremia with NA of 132- frm NV . NS IVF x 1 liter and now drinking


Prescription for Zofran, Naproxen and levaquin, educated on meds and side 

effects





referral to Nephrologist for management of Renal cyst


referal to GI for constipation


F/U with PCP


Educated on diagnosis


CT scan results and labs explained to patient





patient voiced understanding of discharge diagnosis, treatment plan, follow up. 

reports feeling better. Discharge from ed with family in stable condition





- Differential Diagnosis


 Pyelonephritis, cystitis, ovarian cyst, OBS,enetritis, renal stone, 


Critical care attestation.: 


If time is entered above; I have spent that time in minutes in the direct care 

of this critically ill patient, excluding procedure time.








ED Disposition


Clinical Impression: 


 Abdominal pain in female, Pyelonephritis, Hyponatremia, Renal cyst, left, Left 

flank pain





Nausea & vomiting


Qualifiers:


 Vomiting type: unspecified Vomiting Intractability: non-intractable Qualified 

Code(s): R11.2 - Nausea with vomiting, unspecified





Leukocytosis, unspecified


Qualifiers:


 Leukocytosis type: unspecified Qualified Code(s): D72.829 - Elevated white 

blood cell count, unspecified





Constipated


Qualifiers:


 Constipation type: unspecified constipation type Qualified Code(s): K59.00 - 

Constipation, unspecified





Disposition: DC-01 TO HOME OR SELFCARE


Is pt being admited?: No


Does the pt Need Aspirin: No


Condition: Stable


Instructions:  Constipation (ED), Dehydration (ED), Urinary Tract Infection in 

Women (ED), High Fiber Diet (ED), Hyponatremia (ED), Acute Pyelonephritis (ED), 

Acute Abdominal Pain (ED), Leukocytosis (ED), Flank Pain (ED)


Additional Instructions: 


Please follow up with nephrologist regarding and cyst on your left kidney.  

This was seen on previous CT scan and is unchanged but you have to follow-up 

for evaluation.  A nephrology referral in discharge instruction paperwork


Increase her fluid intake to at least 2 L of water/cranberry juice/Gatorade 

daily


A blood carbonated beverages.


Received her first dose of antibiotic emergency room for urinary tract 

infection and he will need to take for another 6 days starting on 2018.


Please avoid doing any strenuous activity to include walking, running and 

anything to do with your legs that are strenuous as antibiotic you are taking 

for   kidney infection can cause rupture tendon especially Achilles tendon


Follow-up with her primary care physician tomorrow


Take Zofran as prescribed for nausea


Take naproxen for abdominal pain and flank pain.


Your CT scan is showing that you have large amount of stool in your colon and I 

prescribed a laxative.  Please take 1 and increase fluid intake and also see 

discharge instruction on high fiber diet


Prescriptions: 


Levofloxacin [Levaquin] 750 mg PO QDAY 6 Days #6 tablet


Naproxen 500 mg PO Q12H PRN #12 tablet


 PRN Reason: Pain


Ondansetron [Zofran Odt] 4 mg PO Q6H PRN #12 tab.rapdis


 PRN Reason: Nausea And Vomiting


Referrals: 


PRIMARY CARE,MD [Primary Care Provider] - 18


JOSE STYLES MD [Staff Physician] - 18


Forms:  Accompanied Note, Work/School Release Form(ED)

## 2018-09-23 ENCOUNTER — HOSPITAL ENCOUNTER (INPATIENT)
Dept: HOSPITAL 5 - ED | Age: 33
LOS: 4 days | Discharge: HOME | DRG: 872 | End: 2018-09-27
Attending: INTERNAL MEDICINE | Admitting: INTERNAL MEDICINE
Payer: MEDICAID

## 2018-09-23 DIAGNOSIS — N30.00: ICD-10-CM

## 2018-09-23 DIAGNOSIS — F17.210: ICD-10-CM

## 2018-09-23 DIAGNOSIS — Q85.00: ICD-10-CM

## 2018-09-23 DIAGNOSIS — M43.6: ICD-10-CM

## 2018-09-23 DIAGNOSIS — Z79.899: ICD-10-CM

## 2018-09-23 DIAGNOSIS — R51: ICD-10-CM

## 2018-09-23 DIAGNOSIS — E87.6: ICD-10-CM

## 2018-09-23 DIAGNOSIS — A41.9: Primary | ICD-10-CM

## 2018-09-23 DIAGNOSIS — B96.20: ICD-10-CM

## 2018-09-23 LAB
ALBUMIN SERPL-MCNC: 4 G/DL (ref 3.9–5)
ALT SERPL-CCNC: 6 UNITS/L (ref 7–56)
APTT BLD: 34.1 SEC. (ref 24.2–36.6)
BASOPHILS # (AUTO): 0.1 K/MM3 (ref 0–0.1)
BASOPHILS CSF MANUAL: 0 %
BASOPHILS NFR BLD AUTO: 0.6 % (ref 0–1.8)
BILIRUB UR QL STRIP: (no result)
BLOOD UR QL VISUAL: (no result)
BUN SERPL-MCNC: 14 MG/DL (ref 7–17)
BUN/CREAT SERPL: 23 %
CALCIUM SERPL-MCNC: 8.9 MG/DL (ref 8.4–10.2)
EOSINOPHIL # BLD AUTO: 0 K/MM3 (ref 0–0.4)
EOSINOPHIL NFR BLD AUTO: 0.1 % (ref 0–4.3)
GLUCOSE CSF-MCNC: 65 MG/DL
HCT VFR BLD CALC: 30.4 % (ref 30.3–42.9)
HEMOLYSIS INDEX: 0
HGB BLD-MCNC: 9.5 GM/DL (ref 10.1–14.3)
INR PPP: 1.24 (ref 0.87–1.13)
INR PPP: 1.38 (ref 0.87–1.13)
LYMPHOCYTES # BLD AUTO: 0.9 K/MM3 (ref 1.2–5.4)
LYMPHOCYTES NFR BLD AUTO: 7.3 % (ref 13.4–35)
MCH RBC QN AUTO: 22 PG (ref 28–32)
MCHC RBC AUTO-ENTMCNC: 31 % (ref 30–34)
MCV RBC AUTO: 72 FL (ref 79–97)
MONOCYTES # (AUTO): 1.1 K/MM3 (ref 0–0.8)
MONOCYTES % (AUTO): 9.7 % (ref 0–7.3)
PH UR STRIP: 6 [PH] (ref 5–7)
PLATELET # BLD: 219 K/MM3 (ref 140–440)
RBC # BLD AUTO: 4.24 M/MM3 (ref 3.65–5.03)
RBC #/AREA URNS HPF: 7 /HPF (ref 0–6)
RED BLOOD CELL,CSF: 97 /MM3 (ref 0–0)
TOTAL CELLS COUNTED: 0 /MM3
UROBILINOGEN UR-MCNC: 4 MG/DL (ref ?–2)
WBC # CSF: 1 /MM3 (ref 1–10)
WBC #/AREA URNS HPF: 110 /HPF (ref 0–6)

## 2018-09-23 PROCEDURE — 85610 PROTHROMBIN TIME: CPT

## 2018-09-23 PROCEDURE — 93005 ELECTROCARDIOGRAM TRACING: CPT

## 2018-09-23 PROCEDURE — 82805 BLOOD GASES W/O2 SATURATION: CPT

## 2018-09-23 PROCEDURE — 85730 THROMBOPLASTIN TIME PARTIAL: CPT

## 2018-09-23 PROCEDURE — 36415 COLL VENOUS BLD VENIPUNCTURE: CPT

## 2018-09-23 PROCEDURE — 80053 COMPREHEN METABOLIC PANEL: CPT

## 2018-09-23 PROCEDURE — 71045 X-RAY EXAM CHEST 1 VIEW: CPT

## 2018-09-23 PROCEDURE — 87186 SC STD MICRODIL/AGAR DIL: CPT

## 2018-09-23 PROCEDURE — 76700 US EXAM ABDOM COMPLETE: CPT

## 2018-09-23 PROCEDURE — 87086 URINE CULTURE/COLONY COUNT: CPT

## 2018-09-23 PROCEDURE — 87116 MYCOBACTERIA CULTURE: CPT

## 2018-09-23 PROCEDURE — 96375 TX/PRO/DX INJ NEW DRUG ADDON: CPT

## 2018-09-23 PROCEDURE — 87040 BLOOD CULTURE FOR BACTERIA: CPT

## 2018-09-23 PROCEDURE — 86160 COMPLEMENT ANTIGEN: CPT

## 2018-09-23 PROCEDURE — 86140 C-REACTIVE PROTEIN: CPT

## 2018-09-23 PROCEDURE — 83873 ASSAY OF CSF PROTEIN: CPT

## 2018-09-23 PROCEDURE — 86592 SYPHILIS TEST NON-TREP QUAL: CPT

## 2018-09-23 PROCEDURE — 96366 THER/PROPH/DIAG IV INF ADDON: CPT

## 2018-09-23 PROCEDURE — 85025 COMPLETE CBC W/AUTO DIFF WBC: CPT

## 2018-09-23 PROCEDURE — 86038 ANTINUCLEAR ANTIBODIES: CPT

## 2018-09-23 PROCEDURE — 82947 ASSAY GLUCOSE BLOOD QUANT: CPT

## 2018-09-23 PROCEDURE — 70551 MRI BRAIN STEM W/O DYE: CPT

## 2018-09-23 PROCEDURE — 86021 WBC ANTIBODY IDENTIFICATION: CPT

## 2018-09-23 PROCEDURE — 93010 ELECTROCARDIOGRAM REPORT: CPT

## 2018-09-23 PROCEDURE — 83916 OLIGOCLONAL BANDS: CPT

## 2018-09-23 PROCEDURE — 80048 BASIC METABOLIC PNL TOTAL CA: CPT

## 2018-09-23 PROCEDURE — 96365 THER/PROPH/DIAG IV INF INIT: CPT

## 2018-09-23 PROCEDURE — 99291 CRITICAL CARE FIRST HOUR: CPT

## 2018-09-23 PROCEDURE — 89051 BODY FLUID CELL COUNT: CPT

## 2018-09-23 PROCEDURE — 87400 INFLUENZA A/B EACH AG IA: CPT

## 2018-09-23 PROCEDURE — 81025 URINE PREGNANCY TEST: CPT

## 2018-09-23 PROCEDURE — 70450 CT HEAD/BRAIN W/O DYE: CPT

## 2018-09-23 PROCEDURE — 009U3ZX DRAINAGE OF SPINAL CANAL, PERCUTANEOUS APPROACH, DIAGNOSTIC: ICD-10-PCS | Performed by: EMERGENCY MEDICINE

## 2018-09-23 PROCEDURE — 86403 PARTICLE AGGLUT ANTBDY SCRN: CPT

## 2018-09-23 PROCEDURE — 96376 TX/PRO/DX INJ SAME DRUG ADON: CPT

## 2018-09-23 PROCEDURE — 82140 ASSAY OF AMMONIA: CPT

## 2018-09-23 PROCEDURE — 81001 URINALYSIS AUTO W/SCOPE: CPT

## 2018-09-23 PROCEDURE — 87076 CULTURE ANAEROBE IDENT EACH: CPT

## 2018-09-23 PROCEDURE — 84160 ASSAY OF PROTEIN ANY SOURCE: CPT

## 2018-09-23 NOTE — EMERGENCY DEPARTMENT REPORT
ED Neck Pain/Injury HPI





- General


Chief Complaint: Neck Pain/Injury


Stated Complaint: NECK PAIN/CHILLS/


Time Seen by Provider: 09/23/18 18:32


Source: patient


Mode of arrival: Ambulatory


Limitations: No Limitations





- History of Present Illness


Initial Comments: 





Patient complains of neck stiffness and headache associated with fever and 

chills which started 2 days ago.  She denies any chest pain, abdominal pain, 

nausea, vomiting and diarrhea.  Patient's also denies any recent sick contact. 

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


MD Complaint: neck pain


-: Gradual, days(s) (2)


Place: home


Severity: severe


Severity scale (0 -10): 9


Quality: dull, aching


Consistency: constant


Improves With: none


Worsens With: none


Associated Symptoms: headache, fever


Treatments Prior to Arrival: Acetaminophen





- Related Data


 Previous Rx's











 Medication  Instructions  Recorded  Last Taken  Type


 


Docusate Sodium [Colace CAP] 100 mg PO BID PRN #20 capsule 08/31/16 Unknown Rx


 


Cefdinir 300 mg PO BID #20 capsule 04/10/17 Unknown Rx


 


Metoclopramide [Reglan] 10 mg PO QID PRN #30 tablet 08/16/17 Unknown Rx


 


HYDROcodone/APAP 5-325 [[place] 1 each PO Q6HR PRN #8 tablet 10/01/17 Unknown Rx





5/325]    


 


Acetaminophen/Codeine [Tylenol 1 tab PO Q6H PRN 3 Days #12 tab 01/14/18 Unknown 

Rx





/Codeine # 3 tab]    


 


Clindamycin [Clindamycin CAP] 300 mg PO Q8H 10 Days #30 cap 01/14/18 Unknown Rx


 


Ibuprofen [Motrin 600 MG tab] 600 mg PO Q8H PRN #30 tablet 05/23/18 Unknown Rx


 


Nitrofurantoin Monohyd/M-Cryst 100 mg PO BID #20 capsule 05/23/18 Unknown Rx





[Macrobid 100 mg Capsule]    


 


Naproxen 500 mg PO Q12H PRN #12 tablet 05/24/18 Unknown Rx


 


Ondansetron [Zofran Odt] 4 mg PO Q6H PRN #12 tab.rapdis 05/24/18 Unknown Rx


 


levoFLOXacin [Levaquin] 750 mg PO QDAY 6 Days #6 tablet 05/24/18 Unknown Rx











 Allergies











Allergy/AdvReac Type Severity Reaction Status Date / Time


 


No Known Allergies Allergy   Verified 08/18/17 17:28














ED Review of Systems


ROS: 


Stated complaint: NECK PAIN/CHILLS/


Other details as noted in HPI





Comment: All other systems reviewed and negative


Constitutional: chills, fever


Eyes: denies: eye pain


ENT: denies: ear pain, throat pain


Respiratory: shortness of breath.  denies: cough


Cardiovascular: denies: chest pain, palpitations, dyspnea on exertion, syncope


Endocrine: no symptoms reported


Gastrointestinal: denies: abdominal pain, nausea, vomiting, diarrhea


Genitourinary: denies: urgency, dysuria, frequency


Musculoskeletal: other (neck pain and stiffness).  denies: back pain, joint 

swelling


Skin: denies: rash, lesions


Neurological: headache.  denies: weakness, numbness, paresthesias, confusion


Psychiatric: denies: anxiety, depression


Hematological/Lymphatic: denies: easy bleeding, easy bruising





ED Past Medical Hx





- Past Medical History


Additional medical history: NEUROFIBROMATOSIS





- Surgical History


Additional Surgical History: TUMORS REMOVED FROM BACK AND LEFT FOOT, 2005





- Social History


Smoking Status: Current Every Day Smoker


Substance Use Type: None





- Medications


Home Medications: 


 Home Medications











 Medication  Instructions  Recorded  Confirmed  Last Taken  Type


 


Docusate Sodium [Colace CAP] 100 mg PO BID PRN #20 capsule 08/31/16  Unknown Rx


 


Cefdinir 300 mg PO BID #20 capsule 04/10/17  Unknown Rx


 


Metoclopramide [Reglan] 10 mg PO QID PRN #30 tablet 08/16/17  Unknown Rx


 


HYDROcodone/APAP 5-325 [[place] 1 each PO Q6HR PRN #8 tablet 10/01/17  Unknown Rx





5/325]     


 


Acetaminophen/Codeine [Tylenol 1 tab PO Q6H PRN 3 Days #12 tab 01/14/18  

Unknown Rx





/Codeine # 3 tab]     


 


Clindamycin [Clindamycin CAP] 300 mg PO Q8H 10 Days #30 cap 01/14/18  Unknown Rx


 


Ibuprofen [Motrin 600 MG tab] 600 mg PO Q8H PRN #30 tablet 05/23/18  Unknown Rx


 


Nitrofurantoin Monohyd/M-Cryst 100 mg PO BID #20 capsule 05/23/18  Unknown Rx





[Macrobid 100 mg Capsule]     


 


Naproxen 500 mg PO Q12H PRN #12 tablet 05/24/18  Unknown Rx


 


Ondansetron [Zofran Odt] 4 mg PO Q6H PRN #12 tab.rapdis 05/24/18  Unknown Rx


 


levoFLOXacin [Levaquin] 750 mg PO QDAY 6 Days #6 tablet 05/24/18  Unknown Rx














ED Physical Exam





- General


Limitations: No Limitations


General appearance: alert, in no apparent distress





- Head


Head exam: Present: atraumatic, normocephalic, normal inspection





- Eye


Eye exam: Present: normal appearance, PERRL, EOMI


Pupils: Present: normal accommodation





- ENT


ENT exam: Present: normal exam, normal orophraynx, mucous membranes moist





- Neck


Neck exam: Present: normal inspection, tenderness, meningismus.  Absent: full 

ROM





- Respiratory


Respiratory exam: Present: normal lung sounds bilaterally.  Absent: respiratory 

distress, wheezes, rales, rhonchi, stridor





- Cardiovascular


Cardiovascular Exam: Present: normal rhythm, tachycardia, normal heart sounds





- GI/Abdominal


GI/Abdominal exam: Present: soft, normal bowel sounds.  Absent: distended, 

tenderness, guarding, rebound, rigid





- Extremities Exam


Extremities exam: Present: normal inspection, full ROM, normal capillary 

refill.  Absent: tenderness





- Back Exam


Back exam: Present: normal inspection, full ROM.  Absent: tenderness, CVA 

tenderness (R), CVA tenderness (L)





- Neurological Exam


Neurological exam: Present: alert, oriented X3, CN II-XII intact





- Psychiatric


Psychiatric exam: Present: normal affect, normal mood





- Skin


Skin exam: Present: warm, dry, intact, normal color, other (Neurofibromatosis)





ED Course


 Vital Signs











  09/23/18 09/23/18 09/23/18





  17:30 17:39 19:47


 


Temperature 101.1 F H 102.3 F H 


 


Pulse Rate 101 H 100 H 


 


Respiratory 20 20 18





Rate   


 


Blood Pressure 123/55  


 


Blood Pressure   





[113/56]   


 


O2 Sat by Pulse 100  





Oximetry   














  09/23/18





  20:57


 


Temperature 99.6 F


 


Pulse Rate 83


 


Respiratory 18





Rate 


 


Blood Pressure 


 


Blood Pressure 109/62





[113/56] 


 


O2 Sat by Pulse 100





Oximetry 














- Reevaluation(s)


Reevaluation #1: 





09/23/18 21:37


I discussed patients care with the hospitalist on call Dr Morton. He will admit 

patient for further evaluation and management. He will follow up on the CSF 

result.





- Lumbar Puncture


Consent Obtained: written consent


Time Out Performed: Yes


Indication for Procedure: headache, fever work up


Patient Position: left lateral decubitus


Skin Prep: Povidone-Iodine 1%


Local Anesthetic Used: Lidocaine 1%


Amount of anesthesia used (mls): 4


Spinal Needle Gauge: 20G


Spinal Needle Length: 3.5in


Interspace Used: L4-L5


Opening Pressure (cmH20): 21


Fluid Initially Obtained: clear


Complications: none


Patient Tolerated Procedure: well





ED Medical Decision Making





- Lab Data


Result diagrams: 


 09/23/18 17:54





 09/23/18 17:54





- EKG Data


-: EKG Interpreted by Me


EKG shows normal: sinus rhythm


Rate: tachycardia (105)





- EKG Data


When compared to previous EKG there are: previous EKG unavailable





09/23/18 19:23


No STEMI.





- Radiology Data


Radiology results: report reviewed, image reviewed





- Medical Decision Making





Possible Meningitis.


Sepsis.


Fever.


Critical Care Time: Yes


Critical care time in (mins) excluding proc time.: 50


Critical care attestation.: 


If time is entered above; I have spent that time in minutes in the direct care 

of this critically ill patient, excluding procedure time.








ED Disposition


Clinical Impression: 


 Neck stiffness





Headache


Qualifiers:


 Headache type: unspecified Headache chronicity pattern: unspecified pattern 

Intractability: intractable Qualified Code(s): R51 - Headache





Fever


Qualifiers:


 Fever type: unspecified Qualified Code(s): R50.9 - Fever, unspecified





Sepsis


Qualifiers:


 Sepsis type: sepsis due to unspecified organism Qualified Code(s): A41.9 - 

Sepsis, unspecified organism





UTI (urinary tract infection)


Qualifiers:


 Urinary tract infection type: acute cystitis Hematuria presence: without 

hematuria Qualified Code(s): N30.00 - Acute cystitis without hematuria





Disposition: DC-09 OP ADMIT IP TO THIS HOSP


Is pt being admited?: Yes


Does the pt Need Aspirin: No


Condition: Stable


Referrals: 


PRIMARY CARE,MD [Primary Care Provider] - 3-5 Days


Time of Disposition: 21:24

## 2018-09-23 NOTE — CAT SCAN REPORT
FINAL REPORT



EXAM:  CT HEAD/BRAIN WO CON



HISTORY:  Headache 



TECHNIQUE:  2.5 millimeter axial images from the skullbase to the

vertex.



Comparison: Head CT dated September 22, 2016 



FINDINGS:  

There is no evidence of an acute intracranial process,

intracranial hemorrhage or mass effect.



The ventricles are normal size.



The visualized portions of the orbits, paranasal and mastoid

sinuses are unremarkable.



The bony structures are unremarkable in appearance.



IMPRESSION:  

1. No evidence of an acute intracranial process, intracranial

hemorrhage or mass effect.

## 2018-09-23 NOTE — XRAY REPORT
FINAL REPORT



EXAM:  XR CHEST 1V AP



HISTORY:  possible Sepsis 



TECHNIQUE:  Frontal portable view of the chest



Comparison: None 



FINDINGS:  

There is no evidence of infiltrate, pneumothorax or pleural fluid

collection.



The cardiomediastinal silhouette is normal in appearance.



The bony structures are unremarkable.



IMPRESSION:  

1. No evidence of an acute pulmonary process.

## 2018-09-24 RX ADMIN — PIPERACILLIN SODIUM AND TAZOBACTAM SODIUM SCH MLS/HR: 3; .375 INJECTION, POWDER, LYOPHILIZED, FOR SOLUTION INTRAVENOUS at 13:20

## 2018-09-24 RX ADMIN — ACETAMINOPHEN PRN MG: 325 TABLET ORAL at 19:56

## 2018-09-24 RX ADMIN — CEFTRIAXONE SODIUM SCH MLS/HR: 2 INJECTION, POWDER, FOR SOLUTION INTRAMUSCULAR; INTRAVENOUS at 18:34

## 2018-09-24 RX ADMIN — ONDANSETRON PRN MG: 2 INJECTION INTRAMUSCULAR; INTRAVENOUS at 19:56

## 2018-09-24 RX ADMIN — PIPERACILLIN SODIUM AND TAZOBACTAM SODIUM SCH MLS/HR: 3; .375 INJECTION, POWDER, LYOPHILIZED, FOR SOLUTION INTRAVENOUS at 06:09

## 2018-09-24 RX ADMIN — OXYCODONE AND ACETAMINOPHEN PRN TAB: 5; 325 TABLET ORAL at 20:27

## 2018-09-24 NOTE — CONSULTATION
History of Present Illness





- Reason for Consult


Consult date: 09/24/18


stiff neck cold


Requesting physician: KIET SHOEMAKER





- History of Present Illness





32 y/o female with history of neurofibromatosis and previous UTIs; she works in 

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 

days history of severe frontal headache and neck stiffness associated with 

fever and chills.  Denies any direct sick contacts.  Denies ear pain, runny nose

, cough.  Denies abdominal pain, nausea, vomiting and diarrhea.  Of note, she 

has been coming to the ED in the recent past with UTIs due to Escherichia coli 

back in September 2016 and August 2017.  She came to the ED for another episode 

of UTI in May 2018 and urine culture grew multiple species. 





In the ED, initial temperature 101.1, heart rate 101, respiration 20, O2 sat 

monitor, blood pressure 123/55.  White count 11.8.  Hemoglobin 9.5.  Platelets 

219.  Creatinine 0.6.  Sodium 133.  Urinalyses show white blood cell 110, 

moderate leukocyte esterase. CXR neg.  CT head neg. CSF wbc 1, glu 65. 





Microbiology:





Blood cultures:


9/23 ngtd 





CSF cultures:


9/23 ngtd





Crypto ag in CSF: neg





Respiratory cultures:








Current Antimicrobials:


Zosyn 9/4 





Previous Antimicrobials:











Past History


Past Medical History: other (neurofibromatosis)


Past Surgical History: No surgical history


Social history: no significant social history


Family history: no significant family history





Medications and Allergies


 Allergies











Allergy/AdvReac Type Severity Reaction Status Date / Time


 


No Known Allergies Allergy   Verified 08/18/17 17:28











 Home Medications











 Medication  Instructions  Recorded  Confirmed  Last Taken  Type


 


Docusate Sodium [Colace CAP] 100 mg PO BID PRN #20 capsule 08/31/16  Unknown Rx


 


Cefdinir 300 mg PO BID #20 capsule 04/10/17  Unknown Rx


 


Metoclopramide [Reglan] 10 mg PO QID PRN #30 tablet 08/16/17  Unknown Rx


 


HYDROcodone/APAP 5-325 [[place] 1 each PO Q6HR PRN #8 tablet 10/01/17  Unknown Rx





5/325]     


 


Acetaminophen/Codeine [Tylenol 1 tab PO Q6H PRN 3 Days #12 tab 01/14/18  

Unknown Rx





/Codeine # 3 tab]     


 


Clindamycin [Clindamycin CAP] 300 mg PO Q8H 10 Days #30 cap 01/14/18  Unknown Rx


 


Ibuprofen [Motrin 600 MG tab] 600 mg PO Q8H PRN #30 tablet 05/23/18  Unknown Rx


 


Nitrofurantoin Monohyd/M-Cryst 100 mg PO BID #20 capsule 05/23/18  Unknown Rx





[Macrobid 100 mg Capsule]     


 


Naproxen 500 mg PO Q12H PRN #12 tablet 05/24/18  Unknown Rx


 


Ondansetron [Zofran Odt] 4 mg PO Q6H PRN #12 tab.rapdis 05/24/18  Unknown Rx


 


levoFLOXacin [Levaquin] 750 mg PO QDAY 6 Days #6 tablet 05/24/18  Unknown Rx











Active Meds: 


Active Medications





Acetaminophen (Tylenol)  650 mg PO Q4H PRN


   PRN Reason: Fever >101


Piperacillin Sod/Tazobactam Sod (Zosyn/Ns 3.375gm/50ml)  3.375 gm in 50 mls @ 

100 mls/hr IV Q6HR EB; Protocol


Ondansetron HCl (Zofran)  4 mg IV Q8H PRN


   PRN Reason: Nausea And Vomiting











Review of Systems


All systems: negative (per HPI rest neg)





Physical Examination





- Physical Exam


Narrative exam: 


General appearance: Alert in NAD, conversant 


Eyes: anicteric sclerae, moist conjunctivae; no lid-lag; PERRLA 


HENT: Atraumatic; oropharynx clear.


Neck: Trachea midline; supple, no thyromegaly or lymphadenopathy 


Lungs: CTA, with normal respiratory effort and no intercostal retractions 


CV: RRR, no murmurs


Abdomen: Soft, non-tender; no masses or hepatosplenomegaly


Extremities: No peripheral edema or extremity lymphadenopathy


Skin: + multiple subcutaneous nodules 


Psych: Appropriate affect, alert and oriented to person, place and time. Neuro: 

alert and oriented x 3. Moving all extermities


Lines: No CVL / PICC














- Constitutional


Vitals: 


 Vital Signs











Temp Pulse Resp BP Pulse Ox


 


 99.6 F   101 H  7 L  125/68   99 


 


 09/23/18 20:57  09/24/18 02:30  09/24/18 02:30  09/24/18 02:30  09/24/18 02:30








 Temperature -Last 24 Hours











Temperature                    99.6 F


 


Temperature                    102.3 F


 


Temperature                    101.1 F

















Results





- Labs


CBC & Chem 7: 


 09/23/18 17:54





 09/23/18 17:54


Labs: 


 Abnormal lab results











  09/23/18 09/23/18 09/23/18 Range/Units





  17:47 17:54 17:54 


 


WBC   11.8 H   (4.5-11.0)  K/mm3


 


Hgb   9.5 L   (10.1-14.3)  gm/dl


 


MCV   72 L   (79-97)  fl


 


MCH   22 L   (28-32)  pg


 


RDW   18.4 H   (13.2-15.2)  %


 


Lymph % (Auto)   7.3 L   (13.4-35.0)  %


 


Mono % (Auto)   9.7 H   (0.0-7.3)  %


 


Lymph #   0.9 L   (1.2-5.4)  K/mm3


 


Mono #   1.1 H   (0.0-0.8)  K/mm3


 


Seg Neutrophils %   82.3 H   (40.0-70.0)  %


 


Seg Neutrophils #   9.7 H   (1.8-7.7)  K/mm3


 


PT    16.1 H  (12.2-14.9)  Sec.


 


INR    1.24 H  (0.87-1.13)  


 


Sodium     (137-145)  mmol/L


 


Potassium     (3.6-5.0)  mmol/L


 


Carbon Dioxide     (22-30)  mmol/L


 


Creatinine     (0.7-1.2)  mg/dL


 


Glucose     ()  mg/dL


 


ALT     (7-56)  units/L


 


Urine WBC (Auto)  110.0 H    (0.0-6.0)  /HPF














  09/23/18 09/23/18 Range/Units





  17:54 21:17 


 


WBC    (4.5-11.0)  K/mm3


 


Hgb    (10.1-14.3)  gm/dl


 


MCV    (79-97)  fl


 


MCH    (28-32)  pg


 


RDW    (13.2-15.2)  %


 


Lymph % (Auto)    (13.4-35.0)  %


 


Mono % (Auto)    (0.0-7.3)  %


 


Lymph #    (1.2-5.4)  K/mm3


 


Mono #    (0.0-0.8)  K/mm3


 


Seg Neutrophils %    (40.0-70.0)  %


 


Seg Neutrophils #    (1.8-7.7)  K/mm3


 


PT   17.5 H  (12.2-14.9)  Sec.


 


INR   1.38 H  (0.87-1.13)  


 


Sodium  133 L   (137-145)  mmol/L


 


Potassium  3.4 L   (3.6-5.0)  mmol/L


 


Carbon Dioxide  20 L   (22-30)  mmol/L


 


Creatinine  0.6 L   (0.7-1.2)  mg/dL


 


Glucose  101 H   ()  mg/dL


 


ALT  6 L   (7-56)  units/L


 


Urine WBC (Auto)    (0.0-6.0)  /HPF














Assessment and Plan





Assessment: 


1) Sepsis: Present on admission, manifested by fever, tachycardia, 

leukocytosis.  Etiology most likely UTI, should r/o influenza


2) Acute UTI/ recurrent


3) Neck stiffness and headache ? influenza ? migraine. No evidence of CSF 

infection 


4) Neurofibromatosis 


5) Previous UTIs - due to Escherichia coli back in September 2016 and August 2017.  She came to the ED for another episode of UTI in May 2018 and urine 

culture grew multiple species. 





 


Plan:


-follow-up blood cultures, urine culture


-influenza antigen 


-stop zosyn


-start ceftriaxone


-if headache is not better consider neurology consult/brain MRI 





Thank you for your consultation, will follow up with you. 





Jackelyn Sorenson MD


Infectious Diseases Specialist


[facility] ([facility])


M [phone]


O [phone]

## 2018-09-24 NOTE — HISTORY AND PHYSICAL REPORT
CHIEF COMPLAINT:  Neck pain.



OTHER COMPLAINTS:  Include fever and chills.



HISTORY OF PRESENTING ILLNESS:  The patient is a 33-year-old female, who says

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

international airplane and presented with headache, neck stiffness, fever and

chills going on for 2 days.  There is no history of chest pain, no history of

shortness of breath.  No history of nausea, vomiting or diarrhea.  Also, there

is no history of nasal congestion or runny nose.



PAST MEDICAL HISTORY:  Pertinent for neurofibromatosis.



PAST SURGICAL HISTORY:  Pertinent for tumor removal from the back and left foot.



FAMILY HISTORY:  Family history is noncontributory.



SOCIAL HISTORY:  The patient smokes cigarettes, does not drink alcohol and does

not use illicit drugs.



MEDICATIONS:  The patient is on docusate sodium 100 mg by mouth twice daily,

cefdinir 300 mg by mouth twice daily, Reglan 10 mg by mouth t.i.d., Norco 5/325

mg by mouth every 6 hours, Tylenol No. 3 one by mouth every 6 hours as needed

for pain.  The patient is also on clindamycin 300 mg by mouth daily and

ibuprofen 600 mg by mouth every 8 hours as needed for pain and Macrobid 100 mg

by mouth twice daily.  The patient is also on naproxen 500 mg by mouth every 12

hours as needed for pain and Zofran 4 mg by mouth every 6 hours for nausea and

vomiting and Levaquin 750 mg daily.



ALLERGIES:  There are no known drug allergies.



REVIEW OF SYSTEMS:

CONSTITUTIONAL:  There is fever, there is chills, but no diaphoresis.

HEENT:  There is headache, but no sore throat.

CARDIOVASCULAR SYSTEM:  There is no chest pain or orthopnea.

RESPIRATORY:  There is no shortness of breath or cough.

GASTROINTESTINAL SYSTEM:  There is no nausea, no vomiting, no abdominal pain,

diarrhea or constipation.

NEUROLOGICAL SYSTEM:  There is no numbness, no dizziness, no altered mental

status.

MUSCULOSKELETAL SYSTEM:  There is neck pain, but no joint pain or swelling.

DERMATOLOGICAL SYSTEM:  There is no skin rash or itching.

GENITOURINARY SYSTEM:  There is no dysuria, hematuria or flank pain.  Rest of

system review is normal.



PHYSICAL EXAMINATION:

GENERAL:  At the time of exam, the patient was found to be alert, oriented x 3

and not in acute distress.

VITAL SIGNS:  Initial vital signs at the time of presentation showed temperature

of 101.1 degrees Fahrenheit, pulse of 101, respiration 20, blood pressure

123/55, O2 sat of 100% on room air.

HEENT:  Showed pupils to be equal, round, reactive to light and accommodating. 

Extraocular muscles are intact.

NECK:  Stiff with no JVD or carotid bruit.

CARDIOVASCULAR SYSTEM:  Showed normal first and second heart sounds with no

gallops or murmurs.

RESPIRATORY SYSTEM:  Showed good air entry on both sides of the lungs with no

abnormal breath sounds.

GASTROINTESTINAL SYSTEM:  Show abdomen to be full, soft, nontender with no

organomegaly or rigidity.

NEUROLOGIC:  Neuro exam shows no focal deficit.

MUSCULOSKELETAL SYSTEM:  Show no joint swelling or tenderness.

DERMATOLOGIC SYSTEM:  Show no skin rash.

GENITOURINARY SYSTEM:  Showing no costovertebral angle tenderness.



PERTINENT LABORATORY AND IMAGING STUDIES:  The patient had a CT of the head

without contrast done that showed no evidence of acute intracranial process or

hemorrhage or mass effect.  Also the patient had chest x-ray done that showed no

evidence of any acute pulmonary process.



The patient's CBC shows elevated white count of 11,800 with low hemoglobin of

9.5 and normal hematocrit of 30.4.  The patient's CBC differential showed

slightly elevated monocyte count of 9.7% with elevated segmented neutrophil of

82.3%.  Coagulation studies showing high PT of 16.1 and slightly elevated INR of

1.2.  The patient's chemistry showed low sodium of 133 with low potassium of

3.4, normal chloride and rest of chemistry was unremarkable.  The patient's

urinalysis show positive urine nitrite with moderate urine leukocyte esterase

and a high urine WBC of 110 with negative urine pregnancy test.  The patient's

cerebrospinal fluid analysis showed clear CSF, which is colorless with CSF WBC

of 1 and CSF RBC of 97 with unremarkable or no significant CSF differential

analysis.  CSF glucose level is 65 with total protein pending and comment is

that there is no cell seen.



DIAGNOSES:

1.  Headache.

2.  Stiff neck.

3.  Urinary tract infection.

4.  Sepsis.



PLAN:  The patient will be admitted to [facility] and will continue

isolation and respiratory precautions until completely ruled out for meningitis.

 The patient will be on IV Zosyn 3.375 grams q. 8 hours and will be on Tylenol

650 mg by mouth every 4 hours for fever and headache and the patient will be on

IV Zofran 4 mg every 8 hours for nausea and vomiting.  The patient will be on IV

normal saline at 150 mL an hour.





DD: 09/24/2018 06:30

DT: 09/24/2018 09:37

JOB# [id number]  [id number]

OCN/NTS

## 2018-09-25 RX ADMIN — ACETAMINOPHEN PRN MG: 325 TABLET ORAL at 06:01

## 2018-09-25 RX ADMIN — OXYCODONE AND ACETAMINOPHEN PRN TAB: 5; 325 TABLET ORAL at 22:29

## 2018-09-25 RX ADMIN — OXYCODONE AND ACETAMINOPHEN PRN TAB: 5; 325 TABLET ORAL at 15:31

## 2018-09-25 RX ADMIN — OXYCODONE AND ACETAMINOPHEN PRN TAB: 5; 325 TABLET ORAL at 10:13

## 2018-09-25 RX ADMIN — CEFTRIAXONE SODIUM SCH MLS/HR: 2 INJECTION, POWDER, FOR SOLUTION INTRAMUSCULAR; INTRAVENOUS at 10:08

## 2018-09-25 NOTE — ULTRASOUND REPORT
ULTRASOUND ABDOMEN COMPLETE:



TECHNIQUE:  Transabdominal ultrasound with color Doppler interrogation.



HISTORY: Acute UTI, evaluate for pyelonephritis, stones.



COMPARISON: none.







FINDINGS:



LIVER: Normal.



BILIARY SYSTEM: Normal.



PANCREAS: Normal.



SPLEEN: Normal.



KIDNEYS: Normal.



AORTA/IVC: Normal.



ASCITES: None.







IMPRESSION:

Unremarkable exam. No evidence for pyelonephritis on ultrasound. If 

further evaluation for pyelonephritis is needed, CT abdomen pelvis with 

IV contrast is recommended.

## 2018-09-25 NOTE — PROGRESS NOTE
Assessment and Plan





Assessment: 


1) Sepsis:Improved, Fever continues. Etiology most likely UTI, should r/o 

influenza


          - BC- ngtd


          -CSF- negative


           -CSF Cryptoccal Antigen - negative


           Urine culture - GNR            


2) Acute UTI/ recurrent


 3) Neck stiffness and headache ? influenza ? migraine. No evidence of CSF 

infection 


4) Neurofibromatosis 


5) Previous UTIs - due to Escherichia coli back in September 2016 and August 2017.  She came to the ED for another episode of UTI in May 2018 and urine 

culture grew multiple species. 





 


Plan:


-f/u on influenza test


-start tamiflu


-continue  ceftriaxone, D2


-f/u urine cx which growing GNRs


-if fever persists will get brain MRI+/- neuro consult


 








Subjective


Date of service: 09/25/18


Interval history: 


Patient was sitting up in bed watching television.  Patient stated that her 

headache was better today, on a pain scale of 1-10, patient stated her headache 

was a 3.








Blood cultures:


9/23 ngtd 





CSF cultures:


9/23 ngtd





Crypto ag in CSF: neg





Respiratory cultures:








Current Antimicrobials:





Ceftriaxone, D2








Previous Antimicrobials:





Zosyn 9/4 








Objective





- Exam


Narrative Exam: 





Narrative exam: 


General appearance: Alert in NAD, conversant 


Eyes: anicteric sclerae, moist conjunctivae; no lid-lag; PERRLA 


HENT: Atraumatic; oropharynx clear.


Neck: Trachea midline; supple, no thyromegaly or lymphadenopathy 


Lungs: CTA, with normal respiratory effort and no intercostal retractions 


CV: RRR, no murmurs


Abdomen: Soft, non-tender; no masses or hepatosplenomegaly


Extremities: No peripheral edema or extremity lymphadenopathy


Skin: + multiple subcutaneous nodules 


Psych: Appropriate affect, alert and oriented to person, place and time. Neuro: 

alert and oriented x 3. Moving all extermities


Lines: No CVL / PICC





- Constitutional


Vitals: 


 Vital Signs











Temp Pulse Resp BP Pulse Ox


 


 101.7 F H  97 H  16   104/61   100 


 


 09/25/18 05:36  09/25/18 05:36  09/25/18 05:36  09/25/18 05:36  09/25/18 05:36








 Temperature -Last 24 Hours











Temperature                    101.7 F


 


Temperature                    98.8 F


 


Temperature                    97.9 F

















- Labs


CBC & Chem 7: 


 09/23/18 17:54





 09/23/18 17:54

## 2018-09-25 NOTE — PROGRESS NOTE
Assessment and Plan


Assessment and plan: 





33F with fever, Neck stiffness/pain and UTI





Sepsis


continue abx, LP cell count was negative


fup flu screening


continue droplet isolation


sp tamiflu


ID on board





UTI


gram neg rods on urine cx


continue abx





History


Interval history: 





continues to have fever and neck pain/stiffness


fever curve is trending down


no vomiting, no cough, no flank pain





Hospitalist Physical





- Constitutional


Vitals: 


 











Temp Pulse Resp BP Pulse Ox


 


 100.0 F H  78   20   104/71   100 


 


 09/25/18 16:50  09/25/18 16:50  09/25/18 22:29  09/25/18 16:50  09/25/18 16:50











General appearance: Present: no acute distress





- EENT


Eyes: Present: PERRL


ENT: hearing intact





- Neck


Neck: Present: supple





- Respiratory


Respiratory effort: normal


Respiratory: bilateral: CTA





- Cardiovascular


Rhythm: regular


Heart Sounds: Present: S1 & S2





- Extremities


Extremities: no ischemia


Peripheral Pulses: within normal limits





- Abdominal


General gastrointestinal: soft, non-tender





- Integumentary


Integumentary: Present: clear, warm





- Psychiatric


Psychiatric: appropriate mood/affect, intact judgment & insight





- Neurologic


Neurologic: CNII-XII intact, no focal deficits, moves all extremities





Results





- Labs


CBC & Chem 7: 


 09/23/18 17:54





 09/23/18 17:54


Labs: 


 Laboratory Last Values











WBC  11.8 K/mm3 (4.5-11.0)  H  09/23/18  17:54    


 


RBC  4.24 M/mm3 (3.65-5.03)   09/23/18  17:54    


 


Hgb  9.5 gm/dl (10.1-14.3)  L  09/23/18  17:54    


 


Hct  30.4 % (30.3-42.9)   09/23/18  17:54    


 


MCV  72 fl (79-97)  L  09/23/18  17:54    


 


MCH  22 pg (28-32)  L  09/23/18  17:54    


 


MCHC  31 % (30-34)   09/23/18  17:54    


 


RDW  18.4 % (13.2-15.2)  H  09/23/18  17:54    


 


Plt Count  219 K/mm3 (140-440)   09/23/18  17:54    


 


Lymph % (Auto)  7.3 % (13.4-35.0)  L  09/23/18  17:54    


 


Mono % (Auto)  9.7 % (0.0-7.3)  H  09/23/18  17:54    


 


Eos % (Auto)  0.1 % (0.0-4.3)   09/23/18  17:54    


 


Baso % (Auto)  0.6 % (0.0-1.8)   09/23/18  17:54    


 


Lymph #  0.9 K/mm3 (1.2-5.4)  L  09/23/18  17:54    


 


Mono #  1.1 K/mm3 (0.0-0.8)  H  09/23/18  17:54    


 


Eos #  0.0 K/mm3 (0.0-0.4)   09/23/18  17:54    


 


Baso #  0.1 K/mm3 (0.0-0.1)   09/23/18  17:54    


 


Seg Neutrophils %  82.3 % (40.0-70.0)  H  09/23/18  17:54    


 


Seg Neutrophils #  9.7 K/mm3 (1.8-7.7)  H  09/23/18  17:54    


 


PT  17.5 Sec. (12.2-14.9)  H  09/23/18  21:17    


 


INR  1.38  (0.87-1.13)  H  09/23/18  21:17    


 


APTT  34.1 Sec. (24.2-36.6)   09/23/18  21:17    


 


VBG pH  7.377  (7.320-7.420)   09/23/18  17:54    


 


Sodium  133 mmol/L (137-145)  L  09/23/18  17:54    


 


Potassium  3.4 mmol/L (3.6-5.0)  L  09/23/18  17:54    


 


Chloride  100.8 mmol/L ()   09/23/18  17:54    


 


Carbon Dioxide  20 mmol/L (22-30)  L  09/23/18  17:54    


 


Anion Gap  16 mmol/L  09/23/18  17:54    


 


BUN  14 mg/dL (7-17)   09/23/18  17:54    


 


Creatinine  0.6 mg/dL (0.7-1.2)  L  09/23/18  17:54    


 


Estimated GFR  > 60 ml/min  09/23/18  17:54    


 


BUN/Creatinine Ratio  23 %  09/23/18  17:54    


 


Glucose  101 mg/dL ()  H  09/23/18  17:54    


 


Lactic Acid  0.80 mmol/L (0.7-2.0)   09/23/18  21:17    


 


Calcium  8.9 mg/dL (8.4-10.2)   09/23/18  17:54    


 


Total Bilirubin  0.40 mg/dL (0.1-1.2)   09/23/18  17:54    


 


AST  14 units/L (5-40)   09/23/18  17:54    


 


ALT  6 units/L (7-56)  L  09/23/18  17:54    


 


Alkaline Phosphatase  78 units/L ()   09/23/18  17:54    


 


Total Protein  8.2 g/dL (6.3-8.2)   09/23/18  17:54    


 


Albumin  4.0 g/dL (3.9-5)   09/23/18  17:54    


 


Albumin/Globulin Ratio  1.0 %  09/23/18  17:54    


 


Urine Color  Yellow  (Yellow)   09/23/18  17:47    


 


Urine Turbidity  Slightly-cloudy  (Clear)   09/23/18  17:47    


 


Urine pH  6.0  (5.0-7.0)   09/23/18  17:47    


 


Ur Specific Gravity  1.014  (1.003-1.030)   09/23/18  17:47    


 


Urine Protein  30 mg/dl mg/dL (Negative)   09/23/18  17:47    


 


Urine Glucose (UA)  Neg mg/dL (Negative)   09/23/18  17:47    


 


Urine Ketones  Neg mg/dL (Negative)   09/23/18  17:47    


 


Urine Blood  Mod  (Negative)   09/23/18  17:47    


 


Urine Nitrite  Pos  (Negative)   09/23/18  17:47    


 


Urine Bilirubin  Neg  (Negative)   09/23/18  17:47    


 


Urine Urobilinogen  4.0 mg/dL (<2.0)   09/23/18  17:47    


 


Ur Leukocyte Esterase  Mod  (Negative)   09/23/18  17:47    


 


Urine WBC (Auto)  110.0 /HPF (0.0-6.0)  H  09/23/18  17:47    


 


Urine RBC (Auto)  7.0 /HPF (0.0-6.0)   09/23/18  17:47    


 


Urine Yeast (Budding)  Few /HPF  09/23/18  17:47    


 


Urine HCG, Qual  Negative  (Negative)   09/23/18  17:47    


 


CSF Appearance  Clear   09/23/18  20:47    


 


CSF Color  Colorless   09/23/18  20:47    


 


CSF WBC  1 /mm3 (1-10)   09/23/18  20:47    


 


CSF RBC  97 /mm3 (0-0)   09/23/18  20:47    


 


CSF Seg Neutrophils  0 % (0-6)   09/23/18  20:47    


 


CSF Lymphocytes %  0 % (40-80)   09/23/18  20:47    


 


CSF Reactive Lymphs  0 %  09/23/18  20:47    


 


CSF Monocytes %  0 % (15-45)   09/23/18  20:47    


 


CSF Eosinophils %  0 %  09/23/18  20:47    


 


CSF Basophils  0 %  09/23/18  20:47    


 


CSF Comment  No cells seen   09/23/18  20:47    


 


CSF Pathologist Review  C   09/23/18  20:47    


 


CSF Glucose  65 mg/dL  09/23/18  20:47    


 


CSF Total Protein  20.72 mg/dL  09/23/18  20:47

## 2018-09-26 LAB
BASOPHILS # (AUTO): 0.1 K/MM3 (ref 0–0.1)
BASOPHILS NFR BLD AUTO: 0.9 % (ref 0–1.8)
EOSINOPHIL # BLD AUTO: 0.1 K/MM3 (ref 0–0.4)
EOSINOPHIL NFR BLD AUTO: 1.3 % (ref 0–4.3)
HCT VFR BLD CALC: 31.8 % (ref 30.3–42.9)
HGB BLD-MCNC: 10.2 GM/DL (ref 10.1–14.3)
LYMPHOCYTES # BLD AUTO: 1.7 K/MM3 (ref 1.2–5.4)
LYMPHOCYTES NFR BLD AUTO: 17.2 % (ref 13.4–35)
MCH RBC QN AUTO: 23 PG (ref 28–32)
MCHC RBC AUTO-ENTMCNC: 32 % (ref 30–34)
MCV RBC AUTO: 71 FL (ref 79–97)
MONOCYTES # (AUTO): 1.5 K/MM3 (ref 0–0.8)
MONOCYTES % (AUTO): 15.3 % (ref 0–7.3)
PLATELET # BLD: 223 K/MM3 (ref 140–440)
RBC # BLD AUTO: 4.48 M/MM3 (ref 3.65–5.03)

## 2018-09-26 RX ADMIN — MORPHINE SULFATE PRN MG: 2 INJECTION, SOLUTION INTRAMUSCULAR; INTRAVENOUS at 12:43

## 2018-09-26 RX ADMIN — ONDANSETRON PRN MG: 2 INJECTION INTRAMUSCULAR; INTRAVENOUS at 21:53

## 2018-09-26 RX ADMIN — MORPHINE SULFATE PRN MG: 2 INJECTION, SOLUTION INTRAMUSCULAR; INTRAVENOUS at 18:26

## 2018-09-26 RX ADMIN — OXYCODONE AND ACETAMINOPHEN PRN TAB: 5; 325 TABLET ORAL at 04:21

## 2018-09-26 RX ADMIN — MORPHINE SULFATE PRN MG: 2 INJECTION, SOLUTION INTRAMUSCULAR; INTRAVENOUS at 21:53

## 2018-09-26 RX ADMIN — CEFTRIAXONE SODIUM SCH MLS/HR: 2 INJECTION, POWDER, FOR SOLUTION INTRAMUSCULAR; INTRAVENOUS at 09:17

## 2018-09-26 RX ADMIN — OXYCODONE AND ACETAMINOPHEN PRN TAB: 5; 325 TABLET ORAL at 16:00

## 2018-09-26 NOTE — PROGRESS NOTE
Assessment and Plan


Assessment and plan: 


Patient is a 32 yo woman with a history of Neurofibromatosis who pw fever, Neck 

stiffness/pain and found to have UTI





* BC- ngtd


* CSF- negative


* CSF Cryptoccal Antigen - negative


* Urine culture - E.coli, [id number]  


* Influenza Ag negative 


* Abd u/s negative for pyelo





Sepsis UTI


continue abx, LP cell count was negative


fup flu screening


continue droplet isolation


sp tamiflu


ID is following





UTI


gram neg rods on urine cx


continue abx





Neck stiff and headaches, with negative LP


ID is following and recommended MRI brain 











History


Interval history: 


Patient was seen and examined. Follow-up on current diagnosis of headache, 

improved this morning. Overnight eventful with fevers. Patient denies any chest 

pain, shortness breath, nausea/vomiting or severe headaches. Imaging, nursing 

note, chart, labs and old chart reviewed. Discussed with patient. Sister and 

mother at bedside. 





Hospitalist Physical





- Physical exam


Narrative exam: 


GEN: WDWN, NAD, Awake, Alert, Orientated x3


HEENT: NCAT, EOMI, PERRL, OP Clear 


NECK: supple, no adenopathy, no thyromegaly, no JVD 


CVS/HEART: RRR, normal S1S2, pulses present bilaterally 


CHEST/LUNGS: CTA B, Symmetrical chest expansion, good air entry bilaterally 


GI/Abdomen: soft, NTND, good bowel sounds, no guarding or rebound 


/Bladder: no suprapubic tenderness, no CVA or paraspinal tenderness 


EXT/Skin: no c/c/e, no obvious rash 


MSK: FROM x 4 


Neuro: CN 2-12 grossly intact, no new focal deficits 


Psych: calm








- Constitutional


Vitals: 


 











Temp Pulse Resp BP Pulse Ox


 


 99.3 F   80   20   114/67   99 


 


 09/26/18 12:00  09/26/18 12:00  09/26/18 12:43  09/26/18 12:00  09/26/18 12:00











General appearance: Present: no acute distress





Results





- Labs


CBC & Chem 7: 


 09/26/18 09:38





 09/23/18 17:54


Labs: 


 Laboratory Last Values











WBC  9.6 K/mm3 (4.5-11.0)   09/26/18  09:38    


 


RBC  4.48 M/mm3 (3.65-5.03)   09/26/18  09:38    


 


Hgb  10.2 gm/dl (10.1-14.3)   09/26/18  09:38    


 


Hct  31.8 % (30.3-42.9)   09/26/18  09:38    


 


MCV  71 fl (79-97)  L  09/26/18  09:38    


 


MCH  23 pg (28-32)  L  09/26/18  09:38    


 


MCHC  32 % (30-34)   09/26/18  09:38    


 


RDW  18.5 % (13.2-15.2)  H  09/26/18  09:38    


 


Plt Count  223 K/mm3 (140-440)   09/26/18  09:38    


 


Lymph % (Auto)  17.2 % (13.4-35.0)   09/26/18  09:38    


 


Mono % (Auto)  15.3 % (0.0-7.3)  H  09/26/18  09:38    


 


Eos % (Auto)  1.3 % (0.0-4.3)   09/26/18  09:38    


 


Baso % (Auto)  0.9 % (0.0-1.8)   09/26/18  09:38    


 


Lymph #  1.7 K/mm3 (1.2-5.4)   09/26/18  09:38    


 


Mono #  1.5 K/mm3 (0.0-0.8)  H  09/26/18  09:38    


 


Eos #  0.1 K/mm3 (0.0-0.4)   09/26/18  09:38    


 


Baso #  0.1 K/mm3 (0.0-0.1)   09/26/18  09:38    


 


Seg Neutrophils %  65.3 % (40.0-70.0)   09/26/18  09:38    


 


Seg Neutrophils #  6.3 K/mm3 (1.8-7.7)   09/26/18  09:38    


 


PT  17.5 Sec. (12.2-14.9)  H  09/23/18  21:17    


 


INR  1.38  (0.87-1.13)  H  09/23/18  21:17    


 


APTT  34.1 Sec. (24.2-36.6)   09/23/18  21:17    


 


VBG pH  7.377  (7.320-7.420)   09/23/18  17:54    


 


Sodium  133 mmol/L (137-145)  L  09/23/18  17:54    


 


Potassium  3.4 mmol/L (3.6-5.0)  L  09/23/18  17:54    


 


Chloride  100.8 mmol/L ()   09/23/18  17:54    


 


Carbon Dioxide  20 mmol/L (22-30)  L  09/23/18  17:54    


 


Anion Gap  16 mmol/L  09/23/18  17:54    


 


BUN  14 mg/dL (7-17)   09/23/18  17:54    


 


Creatinine  0.6 mg/dL (0.7-1.2)  L  09/23/18  17:54    


 


Estimated GFR  > 60 ml/min  09/23/18  17:54    


 


BUN/Creatinine Ratio  23 %  09/23/18  17:54    


 


Glucose  101 mg/dL ()  H  09/23/18  17:54    


 


Lactic Acid  0.80 mmol/L (0.7-2.0)   09/23/18  21:17    


 


Calcium  8.9 mg/dL (8.4-10.2)   09/23/18  17:54    


 


Total Bilirubin  0.40 mg/dL (0.1-1.2)   09/23/18  17:54    


 


AST  14 units/L (5-40)   09/23/18  17:54    


 


ALT  6 units/L (7-56)  L  09/23/18  17:54    


 


Alkaline Phosphatase  78 units/L ()   09/23/18  17:54    


 


Total Protein  8.2 g/dL (6.3-8.2)   09/23/18  17:54    


 


Albumin  4.0 g/dL (3.9-5)   09/23/18  17:54    


 


Albumin/Globulin Ratio  1.0 %  09/23/18  17:54    


 


Urine Color  Yellow  (Yellow)   09/23/18  17:47    


 


Urine Turbidity  Slightly-cloudy  (Clear)   09/23/18  17:47    


 


Urine pH  6.0  (5.0-7.0)   09/23/18  17:47    


 


Ur Specific Gravity  1.014  (1.003-1.030)   09/23/18  17:47    


 


Urine Protein  30 mg/dl mg/dL (Negative)   09/23/18  17:47    


 


Urine Glucose (UA)  Neg mg/dL (Negative)   09/23/18  17:47    


 


Urine Ketones  Neg mg/dL (Negative)   09/23/18  17:47    


 


Urine Blood  Mod  (Negative)   09/23/18  17:47    


 


Urine Nitrite  Pos  (Negative)   09/23/18  17:47    


 


Urine Bilirubin  Neg  (Negative)   09/23/18  17:47    


 


Urine Urobilinogen  4.0 mg/dL (<2.0)   09/23/18  17:47    


 


Ur Leukocyte Esterase  Mod  (Negative)   09/23/18  17:47    


 


Urine WBC (Auto)  110.0 /HPF (0.0-6.0)  H  09/23/18  17:47    


 


Urine RBC (Auto)  7.0 /HPF (0.0-6.0)   09/23/18  17:47    


 


Urine Yeast (Budding)  Few /HPF  09/23/18  17:47    


 


Urine HCG, Qual  Negative  (Negative)   09/23/18  17:47    


 


CSF Appearance  Clear   09/23/18  20:47    


 


CSF Color  Colorless   09/23/18  20:47    


 


CSF WBC  1 /mm3 (1-10)   09/23/18  20:47    


 


CSF RBC  97 /mm3 (0-0)   09/23/18  20:47    


 


CSF Seg Neutrophils  0 % (0-6)   09/23/18  20:47    


 


CSF Lymphocytes %  0 % (40-80)   09/23/18  20:47    


 


CSF Reactive Lymphs  0 %  09/23/18  20:47    


 


CSF Monocytes %  0 % (15-45)   09/23/18  20:47    


 


CSF Eosinophils %  0 %  09/23/18  20:47    


 


CSF Basophils  0 %  09/23/18  20:47    


 


CSF Comment  No cells seen   09/23/18  20:47    


 


CSF Pathologist Review  C   09/23/18  20:47    


 


CSF Glucose  65 mg/dL  09/23/18  20:47    


 


CSF Total Protein  20.72 mg/dL  09/23/18  20:47    


 


CSF VDRL  Nonreactive  (Nonreactive)   09/23/18  20:47    


 


Influenza A (Rapid)  Negative  (Negative)   09/25/18  12:10    


 


Influenza B (Rapid)  Negative  (Negative)   09/25/18  12:10

## 2018-09-26 NOTE — PROGRESS NOTE
Assessment and Plan





Assessment: 


1) Sepsis:Improved, Persistent Fever. Etiology most likely UTI, should r/o 

influenza


          - BC- ngtd


          -CSF- negative


           -CSF Cryptoccal Antigen - negative


           Urine culture - GNR    


   -Influenza Ag negative        


2) Acute UTI/ recurrent


 3) Neck stiffness and headache ? influenza ? migraine. No evidence of CSF 

infection 


            -9/25- Head CT- No evidence of an acute intracranial process, 

intracranial hemorrhage or mass effect.  


4) Neurofibromatosis 


5) Previous UTIs - due to Escherichia coli back in September 2016 and August 2017.  She came to the ED for another episode of UTI in May 2018 and urine 

culture grew multiple species. 





 


Plan:


-continue tamiflu, D2 despite negative Influenza Ag because has close to 50% 

false negative


-continue  ceftriaxone, D3


-obtain brain MRI today 


-repeat blood cx 


-obtain CRP, procal, ARLYN, ANCA,C3/C4


-if initial w/o negative consult neuro and order CT chest, abd, pelvis 


 


 








Subjective


Date of service: 09/26/18


Interval history: 


Patient was sitting up in bed. Family members at bedside.  patient stated that 

she was still having headaches.








Blood cultures:


9/23 ngtd 





CSF cultures:


9/23 ngtd





Crypto ag in CSF: neg





Respiratory cultures:








Current Antimicrobials:





Ceftriaxone, D3








Previous Antimicrobials:





Zosyn 9/4 








Objective





- Exam


Narrative Exam: 





Narrative exam: 


General appearance: Alert in NAD, conversant 


Eyes: anicteric sclerae, moist conjunctivae; no lid-lag; PERRLA 


HENT: Atraumatic; oropharynx clear.


Neck: Trachea midline; supple, no thyromegaly or lymphadenopathy 


Lungs: CTA, with normal respiratory effort and no intercostal retractions 


CV: RRR, no murmurs


Abdomen: Soft, non-tender; no masses or hepatosplenomegaly


Extremities: No peripheral edema or extremity lymphadenopathy


Skin: + multiple subcutaneous nodules 


Psych: Appropriate affect, alert and oriented to person, place and time. Neuro: 

alert and oriented x 3. Moving all extermities


Lines: No CVL / PICC





- Constitutional


Vitals: 


 Vital Signs











Temp Pulse Resp BP Pulse Ox


 


 99.9 F H  71   20   101/60   100 


 


 09/26/18 05:05  09/26/18 05:05  09/26/18 05:21  09/26/18 05:05  09/26/18 05:05








 Temperature -Last 24 Hours











Temperature                    99.9 F


 


Temperature                    101.0 F


 


Temperature                    100.0 F


 


Temperature                    99.1 F

















- Labs


CBC & Chem 7: 


 09/26/18 09:38





 09/23/18 17:54

## 2018-09-27 VITALS — DIASTOLIC BLOOD PRESSURE: 66 MMHG | SYSTOLIC BLOOD PRESSURE: 107 MMHG

## 2018-09-27 LAB
BUN SERPL-MCNC: 8 MG/DL (ref 7–17)
BUN/CREAT SERPL: 16 %
CALCIUM SERPL-MCNC: 8.7 MG/DL (ref 8.4–10.2)
HEMOLYSIS INDEX: 5

## 2018-09-27 RX ADMIN — OXYCODONE AND ACETAMINOPHEN PRN TAB: 5; 325 TABLET ORAL at 13:34

## 2018-09-27 RX ADMIN — CEFTRIAXONE SODIUM SCH MLS/HR: 2 INJECTION, POWDER, FOR SOLUTION INTRAMUSCULAR; INTRAVENOUS at 09:56

## 2018-09-27 NOTE — PROGRESS NOTE
Assessment and Plan


Assessment and plan: 


Patient is a 32 yo woman with a history of Neurofibromatosis who pw fever, Neck 

stiffness/pain and found to have UTI





* BC- ngtd


* CSF- negative


* CSF Cryptoccal Antigen - negative


* Urine culture - E.coli, [id number]  


* Influenza Ag negative 


* Abd u/s negative for pyelo





Sepsis UTI


continue abx, LP was negative


Influenza screen negative, sp tamiflu


ID is following





E. coli UTI


continue abx


Add Levaquin based upon sensitivities





Neck stiff and headaches, with negative LP


ID is following and recommended MRI brain and it is pending 





Hypokalemia


re-check bmp





DVT prophylaxis


add sq heparin





Disposition: continue inpatient care, BMP and mri brain pending, still with low 

grade temp





History


Interval history: 


Patient was seen and examined. Follow-up on current diagnosis of headache, 

improved this morning. Overnight uneventful, max Temp 99.9F. Patient denies any 

chest pain, shortness breath, nausea/vomiting or severe headaches. Imaging, 

nursing note, chart, labs and old chart reviewed. Discussed with patient. 





Hospitalist Physical





- Physical exam


Narrative exam: 


GEN: WDWN, NAD, Awake, Alert, Orientated x3


HEENT: NCAT, EOMI, PERRL, OP Clear 


NECK: supple, no adenopathy, no thyromegaly, no JVD 


CVS/HEART: RRR, normal S1S2, pulses present bilaterally 


CHEST/LUNGS: CTA B, Symmetrical chest expansion, good air entry bilaterally 


GI/Abdomen: soft, NTND, good bowel sounds, no guarding or rebound 


/Bladder: no suprapubic tenderness, no CVA or paraspinal tenderness 


EXT/Skin: no c/c/e, no obvious rash 


MSK: FROM x 4 


Neuro: CN 2-12 grossly intact, no new focal deficits 


Psych: calm








- Constitutional


Vitals: 


 











Temp Pulse Resp BP Pulse Ox


 


 99.2 F   78   12   109/75   100 


 


 09/27/18 05:19  09/27/18 05:19  09/27/18 05:19  09/27/18 05:19  09/27/18 05:19











General appearance: Present: no acute distress





Results





- Labs


CBC & Chem 7: 


 09/26/18 09:38





 09/23/18 17:54


Labs: 


 Laboratory Last Values











WBC  9.6 K/mm3 (4.5-11.0)   09/26/18  09:38    


 


RBC  4.48 M/mm3 (3.65-5.03)   09/26/18  09:38    


 


Hgb  10.2 gm/dl (10.1-14.3)   09/26/18  09:38    


 


Hct  31.8 % (30.3-42.9)   09/26/18  09:38    


 


MCV  71 fl (79-97)  L  09/26/18  09:38    


 


MCH  23 pg (28-32)  L  09/26/18  09:38    


 


MCHC  32 % (30-34)   09/26/18  09:38    


 


RDW  18.5 % (13.2-15.2)  H  09/26/18  09:38    


 


Plt Count  223 K/mm3 (140-440)   09/26/18  09:38    


 


Lymph % (Auto)  17.2 % (13.4-35.0)   09/26/18  09:38    


 


Mono % (Auto)  15.3 % (0.0-7.3)  H  09/26/18  09:38    


 


Eos % (Auto)  1.3 % (0.0-4.3)   09/26/18  09:38    


 


Baso % (Auto)  0.9 % (0.0-1.8)   09/26/18  09:38    


 


Lymph #  1.7 K/mm3 (1.2-5.4)   09/26/18  09:38    


 


Mono #  1.5 K/mm3 (0.0-0.8)  H  09/26/18  09:38    


 


Eos #  0.1 K/mm3 (0.0-0.4)   09/26/18  09:38    


 


Baso #  0.1 K/mm3 (0.0-0.1)   09/26/18  09:38    


 


Seg Neutrophils %  65.3 % (40.0-70.0)   09/26/18  09:38    


 


Seg Neutrophils #  6.3 K/mm3 (1.8-7.7)   09/26/18  09:38    


 


PT  17.5 Sec. (12.2-14.9)  H  09/23/18  21:17    


 


INR  1.38  (0.87-1.13)  H  09/23/18  21:17    


 


APTT  34.1 Sec. (24.2-36.6)   09/23/18  21:17    


 


VBG pH  7.377  (7.320-7.420)   09/23/18  17:54    


 


Sodium  133 mmol/L (137-145)  L  09/23/18  17:54    


 


Potassium  3.4 mmol/L (3.6-5.0)  L  09/23/18  17:54    


 


Chloride  100.8 mmol/L ()   09/23/18  17:54    


 


Carbon Dioxide  20 mmol/L (22-30)  L  09/23/18  17:54    


 


Anion Gap  16 mmol/L  09/23/18  17:54    


 


BUN  14 mg/dL (7-17)   09/23/18  17:54    


 


Creatinine  0.6 mg/dL (0.7-1.2)  L  09/23/18  17:54    


 


Estimated GFR  > 60 ml/min  09/23/18  17:54    


 


BUN/Creatinine Ratio  23 %  09/23/18  17:54    


 


Glucose  101 mg/dL ()  H  09/23/18  17:54    


 


Lactic Acid  0.80 mmol/L (0.7-2.0)   09/23/18  21:17    


 


Calcium  8.9 mg/dL (8.4-10.2)   09/23/18  17:54    


 


Total Bilirubin  0.40 mg/dL (0.1-1.2)   09/23/18  17:54    


 


AST  14 units/L (5-40)   09/23/18  17:54    


 


ALT  6 units/L (7-56)  L  09/23/18  17:54    


 


Alkaline Phosphatase  78 units/L ()   09/23/18  17:54    


 


C-Reactive Protein  17.30 mg/dL (0.00-1.30)  H  09/26/18  14:43    


 


Total Protein  8.2 g/dL (6.3-8.2)   09/23/18  17:54    


 


Albumin  4.0 g/dL (3.9-5)   09/23/18  17:54    


 


Albumin/Globulin Ratio  1.0 %  09/23/18  17:54    


 


Urine Color  Yellow  (Yellow)   09/23/18  17:47    


 


Urine Turbidity  Slightly-cloudy  (Clear)   09/23/18  17:47    


 


Urine pH  6.0  (5.0-7.0)   09/23/18  17:47    


 


Ur Specific Gravity  1.014  (1.003-1.030)   09/23/18  17:47    


 


Urine Protein  30 mg/dl mg/dL (Negative)   09/23/18  17:47    


 


Urine Glucose (UA)  Neg mg/dL (Negative)   09/23/18  17:47    


 


Urine Ketones  Neg mg/dL (Negative)   09/23/18  17:47    


 


Urine Blood  Mod  (Negative)   09/23/18  17:47    


 


Urine Nitrite  Pos  (Negative)   09/23/18  17:47    


 


Urine Bilirubin  Neg  (Negative)   09/23/18  17:47    


 


Urine Urobilinogen  4.0 mg/dL (<2.0)   09/23/18  17:47    


 


Ur Leukocyte Esterase  Mod  (Negative)   09/23/18  17:47    


 


Urine WBC (Auto)  110.0 /HPF (0.0-6.0)  H  09/23/18  17:47    


 


Urine RBC (Auto)  7.0 /HPF (0.0-6.0)   09/23/18  17:47    


 


Urine Yeast (Budding)  Few /HPF  09/23/18  17:47    


 


Urine HCG, Qual  Negative  (Negative)   09/23/18  17:47    


 


CSF Appearance  Clear   09/23/18  20:47    


 


CSF Color  Colorless   09/23/18  20:47    


 


CSF WBC  1 /mm3 (1-10)   09/23/18  20:47    


 


CSF RBC  97 /mm3 (0-0)   09/23/18  20:47    


 


CSF Seg Neutrophils  0 % (0-6)   09/23/18  20:47    


 


CSF Lymphocytes %  0 % (40-80)   09/23/18  20:47    


 


CSF Reactive Lymphs  0 %  09/23/18  20:47    


 


CSF Monocytes %  0 % (15-45)   09/23/18  20:47    


 


CSF Eosinophils %  0 %  09/23/18  20:47    


 


CSF Basophils  0 %  09/23/18  20:47    


 


CSF Comment  No cells seen   09/23/18  20:47    


 


CSF Pathologist Review  C   09/23/18  20:47    


 


CSF Glucose  65 mg/dL  09/23/18  20:47    


 


CSF Total Protein  20.72 mg/dL  09/23/18  20:47    


 


CSF VDRL  Nonreactive  (Nonreactive)   09/23/18  20:47    


 


Influenza A (Rapid)  Negative  (Negative)   09/25/18  12:10    


 


Influenza B (Rapid)  Negative  (Negative)   09/25/18  12:10

## 2018-09-27 NOTE — DISCHARGE SUMMARY
Providers





- Providers


Date of Admission: 


09/24/18 00:06





Date of discharge: 09/27/18


Attending physician: 


MARIA ANTONIA TAYLOR





 





09/24/18 11:49


Consult to Physician [CONS] Routine 


   Comment: 


   Consulting Provider: CAR MERRITT


   Physician Instructions: 


   Reason For Exam: fever, neck stiffness











Primary care physician: 


PRIMARY CARE MD








Hospitalization


Condition: Stable


Hospital course: 


Patient is a 32 yo woman with a history of Neurofibromatosis who pw fever, Neck 

stiffness/pain and found to have UTI. Patient is asymptomatic. 





* BC- ngtd


* CSF- negative


* CSF Cryptoccal Antigen - negative


* Urine culture - E.coli, [id number]  


* Influenza Ag negative 


* Abd u/s negative for pyelo





Sepsis UTI


continue abx, LP was negative


Influenza screen negative, sp tamiflu


ID is following





E. coli UTI


continue abx


Add Levaquin based upon sensitivities





Neck stiff and headaches, with negative LP


ID is following and recommended MRI brain negative for acute findings





Hypokalemia


re-check bmp





DVT prophylaxis


add sq heparin








Disposition: DC-01 TO HOME OR SELFCARE


Time spent for discharge: 34 minutes





Core Measure Documentation





- Palliative Care


Palliative Care/ Comfort Measures: Not Applicable





- Core Measures


Any of the following diagnoses?: none





- VTE Discharge Requirements


Deep Vein Thrombosis/Pulmonary Embolism Present on Admission: No


Has pt received <5 days of overlap therapy or INR<2.0: No


Anticoagulant overlap therapy prescribed at discharge: No


Contraindication No Overlap Therapy order at DC: Not Indicated





Exam





- Physical Exam


Narrative exam: 


GEN: WDWN, NAD, Awake, Alert, Orientated x3


HEENT: NCAT, EOMI, PERRL, OP Clear 


NECK: supple, no adenopathy, no thyromegaly, no JVD 


CVS/HEART: RRR, normal S1S2, pulses present bilaterally 


CHEST/LUNGS: CTA B, Symmetrical chest expansion, good air entry bilaterally 


GI/Abdomen: soft, NTND, good bowel sounds, no guarding or rebound 


/Bladder: no suprapubic tenderness, no CVA or paraspinal tenderness 


EXT/Skin: no c/c/e, no obvious rash 


MSK: FROM x 4 


Neuro: CN 2-12 grossly intact, no new focal deficits 


Psych: calm








- Constitutional


Vitals: 


 











Temp Pulse Resp BP Pulse Ox


 


 98.2 F   84   18   108/60   100 


 


 09/27/18 12:16  09/27/18 12:16  09/27/18 12:16  09/27/18 12:16  09/27/18 12:16














Plan


Activity: other (no strenous activity until cleared by PCP)


Diet: regular


Follow up with: 


PRIMARY CARE,MD [Primary Care Provider] - 3-5 Days


CAR MERRITT MD [Staff Physician] - 7 Days


Forms:  Work/School Release Form


Prescriptions: 


Acetaminophen [Acetaminophen TAB] 650 mg PO Q4H PRN #30 tablet


 PRN Reason: Non Cardiac Pain Or Temp>100.5


Ciprofloxacin HCl [Ciprofloxacin TAB] 500 mg PO BID #14 tablet


oxyCODONE /ACETAMINOPHEN [Percocet 5/325 mg] 1 tab PO Q4H PRN #18 tablet


 PRN Reason: Pain , Severe (7-10)

## 2018-09-27 NOTE — MAGNETIC RESONANCE REPORT
MRI OF THE BRAIN WITHOUT CONTRAST:



HISTORY: Persistent headaches, fever



PROCEDURE:

Multiplanar, multisequence MR imaging of the brain without IV contrast 

was

performed.  



FINDINGS:

Mild nonspecific T2 signal abnormalities are identified in the 

periventricular white matter. The brain parenchyma demonstrates normal 

signal on the remaining sequences.



No evidence for acute ischemia, hemorrhage or mass.  No chronic infarct 

or extra-axial fluid collection.



The midline structures are central.  The basal cisterns are patent. 

Normal ventricular size.



The orbital cavities and sella turcica demonstrate no abnormality.



The visualized paranasal sinuses and mastoid air cells are well aerated.



IMPRESSION:

Nonspecific chronic white matter changes. Otherwise, unremarkable MRI 

of the brain. No acute process is noted.

## 2018-09-27 NOTE — PROGRESS NOTE
Assessment and Plan





Assessment: 


1) Sepsis:Improved, Persistent Fever. Etiology most likely UTI, should r/o 

influenza


          - BC- ngtd


          -CSF- negative


           -CSF Cryptoccal Antigen - negative


           Urine culture - GNR    


   -Influenza Ag negative  


             -CRP- 17.30      


2) Acute UTI/ recurrent


           -9/25 Ultrasound- Unremarkable exam. No evidence for pyelonephritis 

on ultrasound


 3) Neck stiffness and headache ? influenza ? migraine. No evidence of CSF 

infection 


            -9/25- Head CT- No evidence of an acute intracranial process, 

intracranial hemorrhage or mass effect.


            - 9/26 MRI - Nonspecific white matter changes. Otherwise 

unremarkable   


4) Neurofibromatosis 


  5) Previous UTIs - due to Escherichia coli back in September 2016 and August 2017.  She came to the ED for another episode of UTI in May 2018 and urine 

culture grew multiple species. 





 


Plan:


-continue tamiflu,ending 9/28/18.


-stop ceftriaxone


-f/u [facility] for results of ARLYN, ANCC, C3, C4


-patient was advised that if her headaches and fever returned to come back to 

the hospital





ID is signing off


- 


 


 








Subjective


Date of service: 09/27/18


Interval history: 


Patient was sitting up in bed. Patient stated that her headache was much better 

today and that she was ready to go home.








Blood cultures:


9/23 ngtd 





CSF cultures:


9/23 ngtd





Crypto ag in CSF: neg





Respiratory cultures:








Current Antimicrobials:











Previous Antimicrobials:





Zosyn 


Ceftriaxone








Objective





- Exam


Narrative Exam: 





Narrative exam: 


General appearance: Alert in NAD, conversant 


Eyes: anicteric sclerae, moist conjunctivae; no lid-lag; PERRLA 


HENT: Atraumatic; oropharynx clear.


Neck: Trachea midline; supple, no thyromegaly or lymphadenopathy 


Lungs: CTA, with normal respiratory effort and no intercostal retractions 


CV: RRR, no murmurs


Abdomen: Soft, non-tender; no masses or hepatosplenomegaly


Extremities: No peripheral edema or extremity lymphadenopathy


Skin: + multiple subcutaneous nodules 


Psych: Appropriate affect, alert and oriented to person, place and time. Neuro: 

alert and oriented x 3. Moving all extermities


Lines: No CVL / PICC





- Constitutional


Vitals: 


 Vital Signs











Temp Pulse Resp BP Pulse Ox


 


 99.2 F   78   12   109/75   100 


 


 09/27/18 05:19  09/27/18 05:19  09/27/18 05:19  09/27/18 05:19  09/27/18 05:19








 Temperature -Last 24 Hours











Temperature                    99.2 F


 


Temperature                    99.9 F


 


Temperature                    99.1 F


 


Temperature                    99.3 F

















- Labs


CBC & Chem 7: 


 09/26/18 09:38





 09/27/18 08:25


Labs: 


 Abnormal lab results











  09/26/18 09/26/18 Range/Units





  09:38 14:43 


 


MCV  71 L   (79-97)  fl


 


MCH  23 L   (28-32)  pg


 


RDW  18.5 H   (13.2-15.2)  %


 


Mono % (Auto)  15.3 H   (0.0-7.3)  %


 


Mono #  1.5 H   (0.0-0.8)  K/mm3


 


C-Reactive Protein   17.30 H  (0.00-1.30)  mg/dL